# Patient Record
Sex: FEMALE | Race: ASIAN | NOT HISPANIC OR LATINO | URBAN - METROPOLITAN AREA
[De-identification: names, ages, dates, MRNs, and addresses within clinical notes are randomized per-mention and may not be internally consistent; named-entity substitution may affect disease eponyms.]

---

## 2022-02-09 ENCOUNTER — EMERGENCY (EMERGENCY)
Facility: HOSPITAL | Age: 47
LOS: 1 days | Discharge: HOME | End: 2022-02-09
Attending: EMERGENCY MEDICINE | Admitting: EMERGENCY MEDICINE
Payer: MEDICAID

## 2022-02-09 VITALS
RESPIRATION RATE: 16 BRPM | TEMPERATURE: 98 F | DIASTOLIC BLOOD PRESSURE: 85 MMHG | SYSTOLIC BLOOD PRESSURE: 138 MMHG | HEART RATE: 89 BPM | OXYGEN SATURATION: 99 %

## 2022-02-09 PROBLEM — Z00.00 ENCOUNTER FOR PREVENTIVE HEALTH EXAMINATION: Status: ACTIVE | Noted: 2022-02-09

## 2022-02-09 LAB
ALBUMIN SERPL ELPH-MCNC: 4.3 G/DL — SIGNIFICANT CHANGE UP (ref 3.3–5)
ALP SERPL-CCNC: 60 U/L — SIGNIFICANT CHANGE UP (ref 40–120)
ALT FLD-CCNC: 31 U/L — SIGNIFICANT CHANGE UP (ref 4–33)
ANION GAP SERPL CALC-SCNC: 13 MMOL/L — SIGNIFICANT CHANGE UP (ref 7–14)
AST SERPL-CCNC: 57 U/L — HIGH (ref 4–32)
BILIRUB SERPL-MCNC: 0.3 MG/DL — SIGNIFICANT CHANGE UP (ref 0.2–1.2)
BUN SERPL-MCNC: 12 MG/DL — SIGNIFICANT CHANGE UP (ref 7–23)
CALCIUM SERPL-MCNC: 9.3 MG/DL — SIGNIFICANT CHANGE UP (ref 8.4–10.5)
CHLORIDE SERPL-SCNC: 102 MMOL/L — SIGNIFICANT CHANGE UP (ref 98–107)
CO2 SERPL-SCNC: 20 MMOL/L — LOW (ref 22–31)
CREAT SERPL-MCNC: 0.34 MG/DL — LOW (ref 0.5–1.3)
GLUCOSE SERPL-MCNC: 100 MG/DL — HIGH (ref 70–99)
HCT VFR BLD CALC: 36.1 % — SIGNIFICANT CHANGE UP (ref 34.5–45)
HGB BLD-MCNC: 11.9 G/DL — SIGNIFICANT CHANGE UP (ref 11.5–15.5)
MCHC RBC-ENTMCNC: 27.9 PG — SIGNIFICANT CHANGE UP (ref 27–34)
MCHC RBC-ENTMCNC: 33 GM/DL — SIGNIFICANT CHANGE UP (ref 32–36)
MCV RBC AUTO: 84.7 FL — SIGNIFICANT CHANGE UP (ref 80–100)
NRBC # BLD: 0 /100 WBCS — SIGNIFICANT CHANGE UP
NRBC # FLD: 0 K/UL — SIGNIFICANT CHANGE UP
PLATELET # BLD AUTO: 209 K/UL — SIGNIFICANT CHANGE UP (ref 150–400)
POTASSIUM SERPL-MCNC: 4.9 MMOL/L — SIGNIFICANT CHANGE UP (ref 3.5–5.3)
POTASSIUM SERPL-SCNC: 4.9 MMOL/L — SIGNIFICANT CHANGE UP (ref 3.5–5.3)
PROT SERPL-MCNC: 7.9 G/DL — SIGNIFICANT CHANGE UP (ref 6–8.3)
RBC # BLD: 4.26 M/UL — SIGNIFICANT CHANGE UP (ref 3.8–5.2)
RBC # FLD: 13.2 % — SIGNIFICANT CHANGE UP (ref 10.3–14.5)
SODIUM SERPL-SCNC: 135 MMOL/L — SIGNIFICANT CHANGE UP (ref 135–145)
WBC # BLD: 5.04 K/UL — SIGNIFICANT CHANGE UP (ref 3.8–10.5)
WBC # FLD AUTO: 5.04 K/UL — SIGNIFICANT CHANGE UP (ref 3.8–10.5)

## 2022-02-09 PROCEDURE — 93971 EXTREMITY STUDY: CPT | Mod: 26,LT

## 2022-02-09 PROCEDURE — 99285 EMERGENCY DEPT VISIT HI MDM: CPT

## 2022-02-09 NOTE — ED PROVIDER NOTE - CLINICAL SUMMARY MEDICAL DECISION MAKING FREE TEXT BOX
Rahul: 10 years of varicose veins in the left leg and one year of left leg swelling. Worse at the end of the day and better in the morning after she’s been laying down at night. Not red or hot. It is painful, and she takes naproxen for that. Had a thorough evaluation in [facility] approximately two months ago, including lab work and x-rays and Doppler ultrasound that did not reveal a DVT but did reveal venous insufficiency. Suspect patient is having leg swelling from venous insufficiency. Will check labs and a Doppler ultrasound here and refer to vascular surgery.

## 2022-02-09 NOTE — ED PROVIDER NOTE - PROGRESS NOTE DETAILS
GREGG Ybarra: Pt requested a female provider examen a lesion on her labia. I was chaperoned by PA student Marivel Tirado. 1x1cm non tender mobile mass to right labia majora w/o fluctuance, no drainage, no redness or warmth to touch. Findings communicated to Dr. Kay

## 2022-02-09 NOTE — ED ADULT NURSE NOTE - PAIN: PRESENCE, MLM
complains of pain/discomfort Rifampin Pregnancy And Lactation Text: This medication is Pregnancy Category C and it isn't know if it is safe during pregnancy. It is also excreted in breast milk and should not be used if you are breast feeding.

## 2022-02-09 NOTE — ED PROVIDER NOTE - OBJECTIVE STATEMENT
Rahul: 10 years of varicose veins in the left leg and one year of left leg swelling. Worse at the end of the day and better in the morning after she’s been laying down at night. Not red or hot. It is painful, and she takes naproxen for that. Had a third evaluation in [facility] approximately two months ago including lab work and x-rays and Doppler ultrasound that do not reveal a DVT but did reveal Bessy and sufficiency. Suspect patient is having leg swelling from venous insufficiency. Will check labs and a Doppler ultrasound here and refer to vascular surgery.

## 2022-02-09 NOTE — ED ADULT TRIAGE NOTE - CHIEF COMPLAINT QUOTE
Pt c/o LLE pain and edema x2 months and was found to have a blockage so she came back here where she lives for treatment.

## 2022-02-09 NOTE — ED PROVIDER NOTE - PATIENT PORTAL LINK FT
You can access the FollowMyHealth Patient Portal offered by [facility] by registering at the following website: [email or web address]. By joining [facility]’s FollowMyHealth portal, you will also be able to view your health information using other applications (apps) compatible with our system.

## 2022-02-09 NOTE — ED ADULT NURSE NOTE - OBJECTIVE STATEMENT
pt A&ox4, came to ED for, LLE pain and swelling. pt states her leg has always been swollen but has started to get larger and more painful. pt states she had pmh of DVT in LLE. denies anticoagulation use. pt denies Chest pain and SOB. pt denies H/A, Dizziness, lightheadedness, and radiating chest pain. . breathing is spontaneous and unlabored. sating 99% on RA. bilateral pedal and radial pulses palpable and strong. Bed in lowest position, call bell within reach, all other safety and comfort measures provided. awaiting labs results and further orders.

## 2022-02-09 NOTE — ED PROVIDER NOTE - NSFOLLOWUPINSTRUCTIONS_ED_ALL_ED_FT
Follow with Vascular Surgery for evaluation and treatment of varicose veins and leg swelling. Tylenol 500 mg (1 or 2 every 6 hours) and/or naproxen 500 mg (1 every 12 hours) for pain.     Return if pain not controlled with oral medications.     Follow with Vascular Surgery for evaluation and treatment of varicose veins and leg swelling. Tylenol 500 mg (1 or 2 every 6 hours) and/or naproxen 500 mg (1 every 12 hours) for pain.     Return if pain not controlled with oral medications.     Follow with Vascular Surgery for evaluation and treatment of varicose veins and leg swelling.    Follow with Gyn regarding right labia majora nodule. Call [phone] and ask for an appointment at a convenient location.

## 2022-02-09 NOTE — ED ADULT NURSE NOTE - NSIMPLEMENTINTERV_GEN_ALL_ED
Implemented All Universal Safety Interventions:  [place] to call system. Call bell, personal items and telephone within reach. Instruct patient to call for assistance. Room bathroom lighting operational. Non-slip footwear when patient is off stretcher. Physically safe environment: no spills, clutter or unnecessary equipment. Stretcher in lowest position, wheels locked, appropriate side rails in place.

## 2022-02-09 NOTE — ED PROVIDER NOTE - PHYSICAL EXAMINATION
Well appearing, well nourished, awake, alert, oriented to person, place, time/situation and in no apparent distress.    Airway patent    Eyes without scleral injection. No jaundice.    Strong pulse.    Respirations unlabored.    Abdomen soft, non-tender, no guarding.    Spine appears normal, range of motion is not limited, no muscle or joint tenderness. LLE: no pitting edema. Varicose veins present.    Alert and oriented, no gross motor or sensory deficits.    Skin normal color for race, warm, dry and intact. No evidence of rash.    No SI/HI.

## 2022-02-10 ENCOUNTER — APPOINTMENT (OUTPATIENT)
Dept: VASCULAR SURGERY | Facility: CLINIC | Age: 47
End: 2022-02-10
Payer: MEDICAID

## 2022-02-10 VITALS
HEART RATE: 105 BPM | HEIGHT: 61 IN | WEIGHT: 167.55 LBS | BODY MASS INDEX: 31.63 KG/M2 | SYSTOLIC BLOOD PRESSURE: 155 MMHG | TEMPERATURE: 98.2 F | DIASTOLIC BLOOD PRESSURE: 104 MMHG

## 2022-02-10 VITALS — HEART RATE: 97 BPM | DIASTOLIC BLOOD PRESSURE: 98 MMHG | SYSTOLIC BLOOD PRESSURE: 151 MMHG

## 2022-02-10 PROCEDURE — 93985 DUP-SCAN HEMO COMPL BI STD: CPT

## 2022-02-10 PROCEDURE — 99204 OFFICE O/P NEW MOD 45 MIN: CPT

## 2022-02-16 ENCOUNTER — OUTPATIENT (OUTPATIENT)
Dept: OUTPATIENT SERVICES | Facility: HOSPITAL | Age: 47
LOS: 1 days | End: 2022-02-16
Payer: MEDICAID

## 2022-02-16 VITALS
OXYGEN SATURATION: 99 % | SYSTOLIC BLOOD PRESSURE: 148 MMHG | DIASTOLIC BLOOD PRESSURE: 97 MMHG | HEIGHT: 61 IN | WEIGHT: 171.08 LBS | HEART RATE: 80 BPM | TEMPERATURE: 99 F | RESPIRATION RATE: 20 BRPM

## 2022-02-16 DIAGNOSIS — Z11.52 ENCOUNTER FOR SCREENING FOR COVID-19: ICD-10-CM

## 2022-02-16 DIAGNOSIS — Z01.818 ENCOUNTER FOR OTHER PREPROCEDURAL EXAMINATION: ICD-10-CM

## 2022-02-16 DIAGNOSIS — I83.893 VARICOSE VEINS OF BILATERAL LOWER EXTREMITIES WITH OTHER COMPLICATIONS: ICD-10-CM

## 2022-02-16 DIAGNOSIS — Z86.79 PERSONAL HISTORY OF OTHER DISEASES OF THE CIRCULATORY SYSTEM: ICD-10-CM

## 2022-02-16 DIAGNOSIS — Z29.9 ENCOUNTER FOR PROPHYLACTIC MEASURES, UNSPECIFIED: ICD-10-CM

## 2022-02-16 DIAGNOSIS — G47.33 OBSTRUCTIVE SLEEP APNEA (ADULT) (PEDIATRIC): ICD-10-CM

## 2022-02-16 DIAGNOSIS — R60.0 LOCALIZED EDEMA: ICD-10-CM

## 2022-02-16 LAB
ANION GAP SERPL CALC-SCNC: 13 MMOL/L — SIGNIFICANT CHANGE UP (ref 5–17)
BUN SERPL-MCNC: 9 MG/DL — SIGNIFICANT CHANGE UP (ref 7–23)
CALCIUM SERPL-MCNC: 9.8 MG/DL — SIGNIFICANT CHANGE UP (ref 8.4–10.5)
CHLORIDE SERPL-SCNC: 101 MMOL/L — SIGNIFICANT CHANGE UP (ref 96–108)
CO2 SERPL-SCNC: 25 MMOL/L — SIGNIFICANT CHANGE UP (ref 22–31)
CREAT SERPL-MCNC: 0.44 MG/DL — LOW (ref 0.5–1.3)
GLUCOSE SERPL-MCNC: 92 MG/DL — SIGNIFICANT CHANGE UP (ref 70–99)
HCT VFR BLD CALC: 39.6 % — SIGNIFICANT CHANGE UP (ref 34.5–45)
HGB BLD-MCNC: 12.7 G/DL — SIGNIFICANT CHANGE UP (ref 11.5–15.5)
MCHC RBC-ENTMCNC: 28 PG — SIGNIFICANT CHANGE UP (ref 27–34)
MCHC RBC-ENTMCNC: 32.1 GM/DL — SIGNIFICANT CHANGE UP (ref 32–36)
MCV RBC AUTO: 87.2 FL — SIGNIFICANT CHANGE UP (ref 80–100)
NRBC # BLD: 0 /100 WBCS — SIGNIFICANT CHANGE UP (ref 0–0)
PLATELET # BLD AUTO: 254 K/UL — SIGNIFICANT CHANGE UP (ref 150–400)
POTASSIUM SERPL-MCNC: 3.6 MMOL/L — SIGNIFICANT CHANGE UP (ref 3.5–5.3)
POTASSIUM SERPL-SCNC: 3.6 MMOL/L — SIGNIFICANT CHANGE UP (ref 3.5–5.3)
RBC # BLD: 4.54 M/UL — SIGNIFICANT CHANGE UP (ref 3.8–5.2)
RBC # FLD: 13.2 % — SIGNIFICANT CHANGE UP (ref 10.3–14.5)
SARS-COV-2 RNA SPEC QL NAA+PROBE: SIGNIFICANT CHANGE UP
SODIUM SERPL-SCNC: 139 MMOL/L — SIGNIFICANT CHANGE UP (ref 135–145)
WBC # BLD: 7.19 K/UL — SIGNIFICANT CHANGE UP (ref 3.8–10.5)
WBC # FLD AUTO: 7.19 K/UL — SIGNIFICANT CHANGE UP (ref 3.8–10.5)

## 2022-02-16 PROCEDURE — C9803: CPT

## 2022-02-16 PROCEDURE — 80048 BASIC METABOLIC PNL TOTAL CA: CPT

## 2022-02-16 PROCEDURE — U0003: CPT

## 2022-02-16 PROCEDURE — G0463: CPT

## 2022-02-16 PROCEDURE — 85027 COMPLETE CBC AUTOMATED: CPT

## 2022-02-16 PROCEDURE — 36415 COLL VENOUS BLD VENIPUNCTURE: CPT

## 2022-02-16 PROCEDURE — U0005: CPT

## 2022-02-16 RX ORDER — CEFAZOLIN SODIUM 1 G
2000 VIAL (EA) INJECTION ONCE
Refills: 0 | Status: DISCONTINUED | OUTPATIENT
Start: 2022-02-18 | End: 2022-03-05

## 2022-02-16 RX ORDER — LOSARTAN POTASSIUM 100 MG/1
1 TABLET, FILM COATED ORAL
Qty: 0 | Refills: 0 | DISCHARGE

## 2022-02-16 RX ORDER — AMLODIPINE BESYLATE 2.5 MG/1
1 TABLET ORAL
Qty: 0 | Refills: 0 | DISCHARGE

## 2022-02-16 RX ORDER — SODIUM CHLORIDE 9 MG/ML
3 INJECTION INTRAMUSCULAR; INTRAVENOUS; SUBCUTANEOUS EVERY 8 HOURS
Refills: 0 | Status: DISCONTINUED | OUTPATIENT
Start: 2022-02-18 | End: 2022-02-18

## 2022-02-16 NOTE — H&P PST ADULT - PROBLEM SELECTOR PLAN 1
Bilateral Stab Phlebectomies with sonosite ultrasound 2/18/22.  CBC, BMP  COVID 2/16 @ [facility]

## 2022-02-16 NOTE — H&P PST ADULT - ASSESSMENT
CAPRINI SCORE [CLOT]    AGE RELATED RISK FACTORS                                                       MOBILITY RELATED FACTORS  [ x] Age 41-60 years                                            (1 Point)                  [ ] Bed rest                                                        (1 Point)  [ ] Age: 61-74 years                                           (2 Points)                 [ ] Plaster cast                                                   (2 Points)  [ ] Age= 75 years                                              (3 Points)                 [ ] Bed bound for more than 72 hours                 (2 Points)    DISEASE RELATED RISK FACTORS                                               GENDER SPECIFIC FACTORS  [ ] Edema in the lower extremities                       (1 Point)                  [ ] Pregnancy                                                     (1 Point)  [x ] Varicose veins                                               (1 Point)                  [ ] Post-partum < 6 weeks                                   (1 Point)             [ ] BMI > 25 Kg/m2                                            (1 Point)                  [ ] Hormonal therapy  or oral contraception          (1 Point)                 [ ] Sepsis (in the previous month)                        (1 Point)                  [ ] History of pregnancy complications                 (1 point)  [ ] Pneumonia or serious lung disease                                               [ ] Unexplained or recurrent                     (1 Point)           (in the previous month)                               (1 Point)  [ ] Abnormal pulmonary function test                     (1 Point)                 SURGERY RELATED RISK FACTORS  [ ] Acute myocardial infarction                              (1 Point)                 [ ]  Section                                             (1 Point)  [ ] Congestive heart failure (in the previous month)  (1 Point)               [ ] Minor surgery                                                  (1 Point)   [ ] Inflammatory bowel disease                             (1 Point)                 [ ] Arthroscopic surgery                                        (2 Points)  [ ] Central venous access                                      (2 Points)                [ x] General surgery lasting more than 45 minutes   (2 Points)       [ ] Stroke (in the previous month)                          (5 Points)               [ ] Elective arthroplasty                                         (5 Points)                                                                                                                                               HEMATOLOGY RELATED FACTORS                                                 TRAUMA RELATED RISK FACTORS  [ ] Prior episodes of VTE                                     (3 Points)                 [ ] Fracture of the hip, pelvis, or leg                       (5 Points)  [ ] Positive family history for VTE                         (3 Points)                 [ ] Acute spinal cord injury (in the previous month)  (5 Points)  [ ] Prothrombin 79406 A                                     (3 Points)                 [ ] Paralysis  (less than 1 month)                             (5 Points)  [ ] Factor V Leiden                                             (3 Points)                  [ ] Multiple Trauma within 1 month                        (5 Points)  [ ] Lupus anticoagulants                                     (3 Points)                                                           [ ] Anticardiolipin antibodies                               (3 Points)                                                       [ ] High homocysteine in the blood                      (3 Points)                                             [ ] Other congenital or acquired thrombophilia      (3 Points)                                                [ ] Heparin induced thrombocytopenia                  (3 Points)                                          Total Score [   4       ]

## 2022-02-16 NOTE — H&P PST ADULT - HISTORY OF PRESENT ILLNESS
Ms. Eller is a 46-year-old woman who has a longstanding history of bilateral lower extremity varicose veins.  She has worn compression stockings in the past without improvement.  She complains of pain, heaviness, fatigue, cramping and hyperpigmentation in both lower extremities.  She experiences swelling at the level of the ankles bilaterally.  This interferes with her activities of daily living. She has no history of DVT, PE, or coagulopathy. A venous reflux study which demonstrates a short segment of axial reflux in the left short saphenous vein and great saphenous vein but they are very small in length. Seen & evaluated by Dr Nikko Garcia and now presents for bilateral Stab Phlebectomies with sonosite ultrasound 2/18/22.     covid test 2/16 at [facility]  Ms. Eller is a 46-year-old woman who has a longstanding history of bilateral lower extremity varicose veins.  She has worn compression stockings in the past without improvement.  She complains of pain, heaviness, fatigue, cramping and hyperpigmentation in both lower extremities.  She experiences swelling at the level of the ankles bilaterally.  This interferes with her activities of daily living. She has no history of DVT, PE, or coagulopathy. A venous reflux study which demonstrates a short segment of axial reflux in the left short saphenous vein and great saphenous vein but they are very small in length. Seen & evaluated by Dr Nikko Garcia and now presents for Bilateral Stab Phlebectomies with sonosite ultrasound 2/18/22.     covid test 2/16 at [facility]

## 2022-02-17 ENCOUNTER — TRANSCRIPTION ENCOUNTER (OUTPATIENT)
Age: 47
End: 2022-02-17

## 2022-02-18 ENCOUNTER — OUTPATIENT (OUTPATIENT)
Dept: OUTPATIENT SERVICES | Facility: HOSPITAL | Age: 47
LOS: 1 days | Discharge: HOME | End: 2022-02-18
Payer: MEDICAID

## 2022-02-18 ENCOUNTER — APPOINTMENT (OUTPATIENT)
Dept: VASCULAR SURGERY | Facility: HOSPITAL | Age: 47
End: 2022-02-18

## 2022-02-18 VITALS
RESPIRATION RATE: 15 BRPM | DIASTOLIC BLOOD PRESSURE: 77 MMHG | OXYGEN SATURATION: 98 % | HEART RATE: 76 BPM | SYSTOLIC BLOOD PRESSURE: 153 MMHG

## 2022-02-18 VITALS — HEIGHT: 60.98 IN | WEIGHT: 171.08 LBS

## 2022-02-18 DIAGNOSIS — I83.893 VARICOSE VEINS OF BILATERAL LOWER EXTREMITIES WITH OTHER COMPLICATIONS: ICD-10-CM

## 2022-02-18 DIAGNOSIS — R60.0 LOCALIZED EDEMA: ICD-10-CM

## 2022-02-18 LAB — HCG UR QL: NEGATIVE — SIGNIFICANT CHANGE UP

## 2022-02-18 PROCEDURE — 37765 STAB PHLEB VEINS XTR 10-20: CPT | Mod: RT,59

## 2022-02-18 PROCEDURE — 81025 URINE PREGNANCY TEST: CPT

## 2022-02-18 PROCEDURE — 37766 PHLEB VEINS - EXTREM 20+: CPT | Mod: LT

## 2022-02-18 PROCEDURE — 37765 STAB PHLEB VEINS XTR 10-20: CPT | Mod: RT

## 2022-02-18 RX ORDER — FENTANYL CITRATE 50 UG/ML
50 INJECTION INTRAVENOUS
Refills: 0 | Status: DISCONTINUED | OUTPATIENT
Start: 2022-02-18 | End: 2022-02-18

## 2022-02-18 RX ORDER — HYDRALAZINE HCL 50 MG
10 TABLET ORAL ONCE
Refills: 0 | Status: COMPLETED | OUTPATIENT
Start: 2022-02-18 | End: 2022-02-18

## 2022-02-18 RX ORDER — FENTANYL CITRATE 50 UG/ML
25 INJECTION INTRAVENOUS
Refills: 0 | Status: DISCONTINUED | OUTPATIENT
Start: 2022-02-18 | End: 2022-02-18

## 2022-02-18 RX ORDER — ONDANSETRON 8 MG/1
4 TABLET, FILM COATED ORAL ONCE
Refills: 0 | Status: DISCONTINUED | OUTPATIENT
Start: 2022-02-18 | End: 2022-02-18

## 2022-02-18 RX ADMIN — FENTANYL CITRATE 25 MICROGRAM(S): 50 INJECTION INTRAVENOUS at 17:10

## 2022-02-18 RX ADMIN — Medication 10 MILLIGRAM(S): at 16:08

## 2022-02-18 RX ADMIN — FENTANYL CITRATE 25 MICROGRAM(S): 50 INJECTION INTRAVENOUS at 16:48

## 2022-02-18 NOTE — ASU DISCHARGE PLAN (ADULT/PEDIATRIC) - NS MD DC FALL RISK RISK
For information on Fall & Injury Prevention, visit: [email or web address] OR  [email or web address] OR  https://www.cdc.gov/steadi/patient.html

## 2022-02-18 NOTE — BRIEF OPERATIVE NOTE - NSICDXBRIEFPROCEDURE_GEN_ALL_CORE_FT
PROCEDURES:  Stab phlebectomy, varicose vein, greater than 20 stab incisions 18-Feb-2022 15:59:46  Jeb Corey

## 2022-02-18 NOTE — ASU PATIENT PROFILE, ADULT - FALL HARM RISK - UNIVERSAL INTERVENTIONS
Bed in lowest position, wheels locked, appropriate side rails in place/Call bell, personal items and telephone in reach/Instruct patient to call for assistance before getting out of bed or chair/Non-slip footwear when patient is out of bed/[place] to call system/Physically safe environment - no spills, clutter or unnecessary equipment/Purposeful Proactive Rounding/Room/bathroom lighting operational, light cord in reach

## 2022-02-18 NOTE — ASU DISCHARGE PLAN (ADULT/PEDIATRIC) - CARE PROVIDER_API CALL
Ishan Barba)  Surgery  Vascular  [address] [place] [address]  Phone: [phone]  Fax: [phone]  Established Patient  Follow Up Time: 2 weeks

## 2022-02-18 NOTE — PRE-ANESTHESIA EVALUATION ADULT - NSANTHPMHFT_GEN_ALL_CORE
46-year-old woman who has a longstanding history of bilateral lower extremity varicose veins.  She has worn compression stockings in the past without improvement.  She complains of pain, heaviness, fatigue, cramping and hyperpigmentation in both lower extremities.  She experiences swelling at the level of the ankles bilaterally.  This interferes with her activities of daily living. She has no history of DVT, PE, or coagulopathy. A venous reflux study which demonstrates a short segment of axial reflux in the left short saphenous vein and great saphenous vein but they are very small in length.

## 2022-02-18 NOTE — ASU DISCHARGE PLAN (ADULT/PEDIATRIC) - ASU DC SPECIAL INSTRUCTIONSFT
Do not shower until tomorrow night. Tomorrow night (2/19), remove both ACE wrap dressings and gauze wrapping. Keep the steri strips on, they will fall off on their own.    Resume normal activity and resume all home medications.     Follow up with Dr. Barba in 2 weeks. Do not shower until tomorrow night. Tomorrow night (2/19), remove both ACE wrap dressings and gauze wrapping. Keep the steri strips on, they will fall off on their own.    Resume normal activity and resume all home medications. Please take over the counter Tylenol 650 mg, if needed, for pain.    Follow up with Dr. Barba in 2 weeks.

## 2022-02-28 PROBLEM — I10 ESSENTIAL (PRIMARY) HYPERTENSION: Chronic | Status: ACTIVE | Noted: 2022-02-16

## 2022-02-28 PROBLEM — E78.5 HYPERLIPIDEMIA, UNSPECIFIED: Chronic | Status: ACTIVE | Noted: 2022-02-16

## 2022-02-28 PROBLEM — G43.909 MIGRAINE, UNSPECIFIED, NOT INTRACTABLE, WITHOUT STATUS MIGRAINOSUS: Chronic | Status: ACTIVE | Noted: 2022-02-16

## 2022-03-03 ENCOUNTER — APPOINTMENT (OUTPATIENT)
Dept: VASCULAR SURGERY | Facility: CLINIC | Age: 47
End: 2022-03-03
Payer: MEDICAID

## 2022-03-03 VITALS
BODY MASS INDEX: 33.79 KG/M2 | HEART RATE: 87 BPM | DIASTOLIC BLOOD PRESSURE: 95 MMHG | SYSTOLIC BLOOD PRESSURE: 148 MMHG | TEMPERATURE: 96 F | HEIGHT: 61 IN | WEIGHT: 179 LBS

## 2022-03-03 DIAGNOSIS — I83.893 VARICOSE VEINS OF BILATERAL LOWER EXTREMITIES WITH OTHER COMPLICATIONS: ICD-10-CM

## 2022-03-03 PROCEDURE — 99024 POSTOP FOLLOW-UP VISIT: CPT

## 2022-03-03 RX ORDER — AMMONIUM LACTATE 12 %
12 CREAM (GRAM) TOPICAL TWICE DAILY
Qty: 280 | Refills: 2 | Status: ACTIVE | COMMUNITY
Start: 2022-03-03 | End: 1900-01-01

## 2022-03-03 RX ORDER — AMLODIPINE BESYLATE 5 MG/1
TABLET ORAL
Refills: 0 | Status: ACTIVE | COMMUNITY

## 2022-03-03 RX ORDER — ATORVASTATIN CALCIUM 80 MG/1
TABLET, FILM COATED ORAL
Refills: 0 | Status: ACTIVE | COMMUNITY

## 2022-03-03 RX ORDER — LOSARTAN POTASSIUM 100 MG/1
TABLET, FILM COATED ORAL
Refills: 0 | Status: ACTIVE | COMMUNITY

## 2022-03-03 NOTE — PHYSICAL EXAM
[de-identified] : a [FreeTextEntry1] : well healed stab phlebectomy sites in both legs\par no swelling in either leg

## 2022-03-03 NOTE — DISCUSSION/SUMMARY
[FreeTextEntry1] : Problem #1 symptomatic varicose veins of both legs\par - s/p stab phlebectomies with resolution of symptoms in legs\par - rec compression and elevation of both legs for underlying CVI\par \par Problem #2 neuropathy in feet\par - continue gabapentin\par - rec eval by PCP

## 2022-03-03 NOTE — REASON FOR VISIT
[Spouse] : spouse [de-identified] : Bilateral lower extremity stab phlebectomies [de-identified] : 02/18/22 [de-identified] : 2 [de-identified] : Patients states she is still having tingling in her feet only. She states both her legs feels much better than they did prior to surgery. She denies numbness or pain in her feet. She states gabapentin helps to relieve her pain.

## 2022-03-15 ENCOUNTER — APPOINTMENT (OUTPATIENT)
Dept: VASCULAR SURGERY | Facility: CLINIC | Age: 47
End: 2022-03-15
Payer: MEDICAID

## 2022-03-15 PROCEDURE — 99441: CPT

## 2022-03-16 ENCOUNTER — EMERGENCY (EMERGENCY)
Facility: HOSPITAL | Age: 47
LOS: 1 days | Discharge: HOME | End: 2022-03-16
Attending: EMERGENCY MEDICINE
Payer: MEDICAID

## 2022-03-16 VITALS
HEIGHT: 61 IN | DIASTOLIC BLOOD PRESSURE: 90 MMHG | WEIGHT: 169.98 LBS | OXYGEN SATURATION: 96 % | TEMPERATURE: 98 F | RESPIRATION RATE: 18 BRPM | SYSTOLIC BLOOD PRESSURE: 137 MMHG | HEART RATE: 115 BPM

## 2022-03-16 PROCEDURE — 99285 EMERGENCY DEPT VISIT HI MDM: CPT

## 2022-03-16 NOTE — ED ADULT TRIAGE NOTE - BSA (M2)
Patient verbally informed that body search would be performed to  remove any items that may be potentially used for self harm or suicidal behavior, ensure that no potentially dangerous mind or mood altering drugs were being introduced into treatment environment, remove any items that may pose a risk for personal safety due to thought or mood disorder and advised about what would occur during the search process.  Patient denies being in possession of potentially dangerous items.  The patient was provided with an opportunity to ask questions or voice any concerns related to the body surface search prior to it being performed.  The patient agreed to participate in the search process.  Body surface search was conducted by two staff members: (Sylwia TRUJILLO and Conrad AWAD).  Patient response to search process was Cooperative with no adverse physical or psychological response.  Contraband was not found during the search process.    1.76

## 2022-03-17 VITALS
HEART RATE: 93 BPM | SYSTOLIC BLOOD PRESSURE: 122 MMHG | RESPIRATION RATE: 18 BRPM | OXYGEN SATURATION: 98 % | TEMPERATURE: 98 F | DIASTOLIC BLOOD PRESSURE: 77 MMHG

## 2022-03-17 LAB
ALBUMIN SERPL ELPH-MCNC: 3.8 G/DL — SIGNIFICANT CHANGE UP (ref 3.5–5)
ALP SERPL-CCNC: 62 U/L — SIGNIFICANT CHANGE UP (ref 40–120)
ALT FLD-CCNC: 36 U/L DA — SIGNIFICANT CHANGE UP (ref 10–60)
ANION GAP SERPL CALC-SCNC: 6 MMOL/L — SIGNIFICANT CHANGE UP (ref 5–17)
AST SERPL-CCNC: 20 U/L — SIGNIFICANT CHANGE UP (ref 10–40)
BASOPHILS # BLD AUTO: 0.01 K/UL — SIGNIFICANT CHANGE UP (ref 0–0.2)
BASOPHILS NFR BLD AUTO: 0.1 % — SIGNIFICANT CHANGE UP (ref 0–2)
BILIRUB SERPL-MCNC: 0.3 MG/DL — SIGNIFICANT CHANGE UP (ref 0.2–1.2)
BUN SERPL-MCNC: 7 MG/DL — SIGNIFICANT CHANGE UP (ref 7–18)
CALCIUM SERPL-MCNC: 8.9 MG/DL — SIGNIFICANT CHANGE UP (ref 8.4–10.5)
CHLORIDE SERPL-SCNC: 107 MMOL/L — SIGNIFICANT CHANGE UP (ref 96–108)
CO2 SERPL-SCNC: 27 MMOL/L — SIGNIFICANT CHANGE UP (ref 22–31)
CREAT SERPL-MCNC: 0.53 MG/DL — SIGNIFICANT CHANGE UP (ref 0.5–1.3)
D DIMER BLD IA.RAPID-MCNC: <150 NG/ML DDU — SIGNIFICANT CHANGE UP
EGFR: 114 ML/MIN/1.73M2 — SIGNIFICANT CHANGE UP
EOSINOPHIL # BLD AUTO: 0.07 K/UL — SIGNIFICANT CHANGE UP (ref 0–0.5)
EOSINOPHIL NFR BLD AUTO: 0.8 % — SIGNIFICANT CHANGE UP (ref 0–6)
GLUCOSE SERPL-MCNC: 137 MG/DL — HIGH (ref 70–99)
HCG SERPL-ACNC: <1 MIU/ML — SIGNIFICANT CHANGE UP
HCT VFR BLD CALC: 36.7 % — SIGNIFICANT CHANGE UP (ref 34.5–45)
HGB BLD-MCNC: 11.9 G/DL — SIGNIFICANT CHANGE UP (ref 11.5–15.5)
IMM GRANULOCYTES NFR BLD AUTO: 0.3 % — SIGNIFICANT CHANGE UP (ref 0–1.5)
LYMPHOCYTES # BLD AUTO: 2.2 K/UL — SIGNIFICANT CHANGE UP (ref 1–3.3)
LYMPHOCYTES # BLD AUTO: 25.1 % — SIGNIFICANT CHANGE UP (ref 13–44)
MCHC RBC-ENTMCNC: 27.7 PG — SIGNIFICANT CHANGE UP (ref 27–34)
MCHC RBC-ENTMCNC: 32.4 GM/DL — SIGNIFICANT CHANGE UP (ref 32–36)
MCV RBC AUTO: 85.3 FL — SIGNIFICANT CHANGE UP (ref 80–100)
MONOCYTES # BLD AUTO: 0.55 K/UL — SIGNIFICANT CHANGE UP (ref 0–0.9)
MONOCYTES NFR BLD AUTO: 6.3 % — SIGNIFICANT CHANGE UP (ref 2–14)
NEUTROPHILS # BLD AUTO: 5.89 K/UL — SIGNIFICANT CHANGE UP (ref 1.8–7.4)
NEUTROPHILS NFR BLD AUTO: 67.4 % — SIGNIFICANT CHANGE UP (ref 43–77)
NRBC # BLD: 0 /100 WBCS — SIGNIFICANT CHANGE UP (ref 0–0)
PLATELET # BLD AUTO: 274 K/UL — SIGNIFICANT CHANGE UP (ref 150–400)
POTASSIUM SERPL-MCNC: 3.4 MMOL/L — LOW (ref 3.5–5.3)
POTASSIUM SERPL-SCNC: 3.4 MMOL/L — LOW (ref 3.5–5.3)
PROT SERPL-MCNC: 7.7 G/DL — SIGNIFICANT CHANGE UP (ref 6–8.3)
RBC # BLD: 4.3 M/UL — SIGNIFICANT CHANGE UP (ref 3.8–5.2)
RBC # FLD: 13.2 % — SIGNIFICANT CHANGE UP (ref 10.3–14.5)
SODIUM SERPL-SCNC: 140 MMOL/L — SIGNIFICANT CHANGE UP (ref 135–145)
TROPONIN I, HIGH SENSITIVITY RESULT: <3 NG/L — SIGNIFICANT CHANGE UP
WBC # BLD: 8.75 K/UL — SIGNIFICANT CHANGE UP (ref 3.8–10.5)
WBC # FLD AUTO: 8.75 K/UL — SIGNIFICANT CHANGE UP (ref 3.8–10.5)

## 2022-03-17 PROCEDURE — 84484 ASSAY OF TROPONIN QUANT: CPT

## 2022-03-17 PROCEDURE — 85025 COMPLETE CBC W/AUTO DIFF WBC: CPT

## 2022-03-17 PROCEDURE — 80053 COMPREHEN METABOLIC PANEL: CPT

## 2022-03-17 PROCEDURE — 71045 X-RAY EXAM CHEST 1 VIEW: CPT | Mod: 26

## 2022-03-17 PROCEDURE — 85379 FIBRIN DEGRADATION QUANT: CPT

## 2022-03-17 PROCEDURE — 93005 ELECTROCARDIOGRAM TRACING: CPT

## 2022-03-17 PROCEDURE — 99285 EMERGENCY DEPT VISIT HI MDM: CPT | Mod: 25

## 2022-03-17 PROCEDURE — 84702 CHORIONIC GONADOTROPIN TEST: CPT

## 2022-03-17 PROCEDURE — 36415 COLL VENOUS BLD VENIPUNCTURE: CPT

## 2022-03-17 PROCEDURE — 71045 X-RAY EXAM CHEST 1 VIEW: CPT

## 2022-03-17 PROCEDURE — 96374 THER/PROPH/DIAG INJ IV PUSH: CPT

## 2022-03-17 RX ORDER — IBUPROFEN 200 MG
1 TABLET ORAL
Qty: 28 | Refills: 0
Start: 2022-03-17 | End: 2022-03-23

## 2022-03-17 RX ORDER — OXYCODONE AND ACETAMINOPHEN 5; 325 MG/1; MG/1
1 TABLET ORAL
Qty: 12 | Refills: 0
Start: 2022-03-17 | End: 2022-03-19

## 2022-03-17 RX ORDER — KETOROLAC TROMETHAMINE 30 MG/ML
30 SYRINGE (ML) INJECTION ONCE
Refills: 0 | Status: DISCONTINUED | OUTPATIENT
Start: 2022-03-17 | End: 2022-03-17

## 2022-03-17 RX ADMIN — Medication 30 MILLIGRAM(S): at 01:21

## 2022-03-17 NOTE — ED PROVIDER NOTE - NSFOLLOWUPCLINICS_GEN_ALL_ED_FT
[facility]  Cardiology  [address] [facility] [address], [address]  [place] [address]  Phone: [phone]  Fax:     
bed rails

## 2022-03-17 NOTE — ED ADULT NURSE NOTE - NSIMPLEMENTINTERV_GEN_ALL_ED
Implemented All Universal Safety Interventions:  [place] to call system. Call bell, personal items and telephone within reach. Instruct patient to call for assistance. Room bathroom lighting operational. Non-slip footwear when patient is off stretcher. Physically safe environment: no spills, clutter or unnecessary equipment. Stretcher in lowest position, wheels locked, appropriate side rails in place.

## 2022-03-17 NOTE — ED PROVIDER NOTE - PROGRESS NOTE DETAILS
labs are unremarkable. cxr clear. ecg sinus tachy, no acute ischemic changes. spoke to be about admission vs. dc and f/u with cardio outpatient- pt prefers to f/u outpatient. will also give rx for pain meds and abx for vaginal abscess. f/u cardiology and obgyn. return precautions discussed.

## 2022-03-17 NOTE — ED PROVIDER NOTE - CLINICAL SUMMARY MEDICAL DECISION MAKING FREE TEXT BOX
46 year old female with cp and right vaginal abscess. PE as above.  labs, ecg, cxr, pain control, reassess

## 2022-03-17 NOTE — ED PROVIDER NOTE - PATIENT PORTAL LINK FT
You can access the FollowMyHealth Patient Portal offered by [facility] by registering at the following website: [email or web address]. By joining [facility]’s FollowMyHealth portal, you will also be able to view your health information using other applications (apps) compatible with our system.

## 2022-03-17 NOTE — ED ADULT NURSE NOTE - CHIEF COMPLAINT QUOTE
biba with c/o rt. sided chest pain and ruptured cysts in the vagina h/o of heart attack in the past as per ems

## 2022-03-17 NOTE — ED PROVIDER NOTE - OBJECTIVE STATEMENT
46 year old female PMh HTN, HLD, CAD? (had angio in 2017 but no stents placed) coming in with 2 weeks of chest pain, sometimes left, sometimes, right, sometimes substernal that hasn't gone away. also states abscess to right vaginal area which is recurrent. denies cough, fevers, chills, sweats, palpitations, abd pains, N/v/D/C, back pains, ha, dizziness.

## 2022-03-17 NOTE — ED PROVIDER NOTE - NSFOLLOWUPINSTRUCTIONS_ED_ALL_ED_FT
Log Out.      [employer] CareNotes®     :  [facility]  	                       CHEST PAIN - AfterCare(R) Instructions(ER/ED)           Chest Pain    WHAT YOU NEED TO KNOW:    Chest pain can be caused by a range of conditions, from not serious to life-threatening. Chest pain can be a symptom of a digestive problem, such as acid reflux or a stomach ulcer. An anxiety attack or a strong emotion, such as anger, can also cause chest pain. Infection, inflammation, or a fracture in the bones or cartilage in your chest can cause pain or discomfort. Sometimes chest pain or pressure is caused by poor blood flow to your heart (angina). Chest pain may also be caused by life-threatening conditions such as a heart attack or blood clot in your lungs.    DISCHARGE INSTRUCTIONS:    Call your local emergency number (911 in the [place]) or have someone call if:   •You have any of the following signs of a heart attack: ?Squeezing, pressure, or pain in your chest      ?You may also have any of the following: ?Discomfort or pain in your back, neck, jaw, stomach, or arm      ?Shortness of breath      ?Nausea or vomiting      ?Lightheadedness or a sudden cold sweat            Return to the emergency department if:   •You have chest discomfort that gets worse, even with medicine.      •You cough or vomit blood.      •Your bowel movements are black or bloody.      •You cannot stop vomiting, or it hurts to swallow.      Call your doctor if:   •You have questions or concerns about your condition or care.          Medicines:   •Medicines may be given to treat the cause of your chest pain. Examples include pain medicine, anxiety medicine, or medicines to increase blood flow to your heart.      •Do not take certain medicines without asking your healthcare provider first. These include NSAIDs, herbal or vitamin supplements, and hormones, such as estrogen or progestin.      •Take your medicine as directed. Contact your healthcare provider if you think your medicine is not helping or if you have side effects. Tell him or her if you are allergic to any medicine. Keep a list of the medicines, vitamins, and herbs you take. Include the amounts, and when and why you take them. Bring the list or the pill bottles to follow-up visits. Carry your medicine list with you in case of an emergency.      Healthy living tips: If the cause of your chest pain is known, your healthcare provider will give you specific guidelines to follow. The following are general healthy guidelines:  •Do not smoke. Nicotine and other chemicals in cigarettes and cigars can cause lung and heart damage. Ask your healthcare provider for information if you currently smoke and need help to quit. E-cigarettes or smokeless tobacco still contain nicotine. Talk to your healthcare provider before you use these products.      •Choose a variety of healthy foods as often as possible. Include fresh, frozen, or canned fruits and vegetables. Also include low-fat dairy products, fish, chicken (without skin), and lean meats. Your healthcare provider or a dietitian can help you create meal plans. You may need to avoid certain foods or drinks if your pain is caused by a digestion problem.  Healthy Foods           •Lower your sodium (salt) intake. Limit foods that are high in sodium, such as canned foods, salty snacks, and cold cuts. If you add salt when you cook food, do not add more at the table. Choose low-sodium canned foods as much as possible.             •Drink plenty of water every day. Water helps your body to control your temperature and blood pressure. Ask your healthcare provider how much water you should drink every day.      •Ask about activity. Your healthcare provider will tell you which activities to limit or avoid. Ask when you can drive, return to work, and have sex. Ask about the best exercise plan for you.      •Maintain a healthy weight. Ask your healthcare provider what a healthy weight is for you. Ask him or her to help you create a safe weight loss plan if you are overweight.      •Ask about vaccines you may need. Your healthcare provider can tell you which vaccines you need, and when to get them. The following vaccines help prevent diseases that can become serious for a person with a heart condition:?The influenza (flu) vaccine is given each year. Get a flu vaccine as soon as recommended, usually in September or October.      ?The pneumonia vaccine is usually given every 5 years. Your healthcare provider may recommend the pneumonia vaccine if you are 65 or older.      ?COVID-19 vaccines are given to adults as a shot in 1 or 2 doses.   Vaccination is recommended for all adults. A booster (additional) dose is also recommended to help your immune system continue to protect against severe COVID-19. The booster can be a different brand of the COVID-19 vaccine than you originally received. The timing for the booster depends on the type of vaccine you received:?1-dose vaccine: The booster is given at least 2 months after you received the vaccine.      ?2-dose vaccine: The booster is given at least 5 to 6 months after the second dose.         Prevent Heart Disease          Follow up with your doctor within 72 hours, or as directed: You may need to return for more tests to find the cause of your chest pain. You may be referred to a specialist, such as a cardiologist or gastroenterologist. Write down your questions so you remember to ask them during your visits.       © Copyright [employer] 2022           back to top                          © Copyright [employer] 2022

## 2022-05-01 NOTE — ED ADULT TRIAGE NOTE - AS HEIGHT TYPE
[Dear  ___] : Dear  [unfilled], [Consult Letter:] : I had the pleasure of evaluating your patient, [unfilled]. [Please see my note below.] : Please see my note below. [Sincerely,] : Sincerely, [FreeTextEntry3] : Héctor Guaman MD, FACS\par  stated

## 2022-08-10 ENCOUNTER — OUTPATIENT (OUTPATIENT)
Dept: OUTPATIENT SERVICES | Facility: HOSPITAL | Age: 47
LOS: 1 days | End: 2022-08-10
Payer: MEDICAID

## 2022-08-10 VITALS
OXYGEN SATURATION: 96 % | WEIGHT: 167.99 LBS | RESPIRATION RATE: 16 BRPM | HEIGHT: 60 IN | HEART RATE: 94 BPM | TEMPERATURE: 98 F | DIASTOLIC BLOOD PRESSURE: 90 MMHG | SYSTOLIC BLOOD PRESSURE: 151 MMHG

## 2022-08-10 DIAGNOSIS — K21.9 GASTRO-ESOPHAGEAL REFLUX DISEASE WITHOUT ESOPHAGITIS: ICD-10-CM

## 2022-08-10 DIAGNOSIS — E78.5 HYPERLIPIDEMIA, UNSPECIFIED: ICD-10-CM

## 2022-08-10 DIAGNOSIS — Z98.890 OTHER SPECIFIED POSTPROCEDURAL STATES: Chronic | ICD-10-CM

## 2022-08-10 DIAGNOSIS — I10 ESSENTIAL (PRIMARY) HYPERTENSION: ICD-10-CM

## 2022-08-10 DIAGNOSIS — M72.2 PLANTAR FASCIAL FIBROMATOSIS: ICD-10-CM

## 2022-08-10 DIAGNOSIS — Z01.818 ENCOUNTER FOR OTHER PREPROCEDURAL EXAMINATION: ICD-10-CM

## 2022-08-10 PROCEDURE — G0463: CPT

## 2022-08-10 RX ORDER — ESOMEPRAZOLE MAGNESIUM 40 MG/1
1 CAPSULE, DELAYED RELEASE ORAL
Qty: 0 | Refills: 0 | DISCHARGE

## 2022-08-10 RX ORDER — AMITRIPTYLINE HCL 25 MG
2 TABLET ORAL
Qty: 0 | Refills: 0 | DISCHARGE

## 2022-08-10 RX ORDER — SODIUM CHLORIDE 9 MG/ML
3 INJECTION INTRAMUSCULAR; INTRAVENOUS; SUBCUTANEOUS EVERY 8 HOURS
Refills: 0 | Status: DISCONTINUED | OUTPATIENT
Start: 2022-08-25 | End: 2022-08-25

## 2022-08-10 NOTE — H&P PST ADULT - RESPIRATORY
normal/clear to auscultation bilaterally/no wheezes/no rales/no rhonchi/no respiratory distress/no use of accessory muscles/airway patent/breath sounds equal/good air movement/respiratory distress

## 2022-08-10 NOTE — H&P PST ADULT - HISTORY OF PRESENT ILLNESS
46 yr old female with history of HTN, hyperlipidemia, GERD, presents with plantar fascial fibromatosis. Pt c/o of pain which radiates from her ankle up her leg bilaterally. Pt had injections to both feet with minimal relief and she takes Tylenol for relief. Pt had evaluation and is schedule for Endoscopic Plantar Fasciotomy bilateral feet on 8/25/2022.

## 2022-08-10 NOTE — H&P PST ADULT - FALL HARM RISK - UNIVERSAL INTERVENTIONS
Bed in lowest position, wheels locked, appropriate side rails in place/Call bell, personal items and telephone in reach/Instruct patient to call for assistance before getting out of bed or chair/[place] to call system/Physically safe environment - no spills, clutter or unnecessary equipment/Purposeful Proactive Rounding/Room/bathroom lighting operational, light cord in reach

## 2022-08-10 NOTE — H&P PST ADULT - ATTENDING COMMENTS
pt consents for plantar fascia release both feet by endoscopic or open means as necessary  risks and failure discusse din great detail   answered questions

## 2022-08-10 NOTE — H&P PST ADULT - PROBLEM SELECTOR PLAN 4
Schedule for Endoscopic Plantar Fasciotomy bilateral feet.      Pt instructed to be NPO the night before surgery except in am of surgery to take bp meds with sip of water and remain NPO. Pt provided with chlorhexidene 4% solution to wash 3 days including the day of surgery. Pt understood all instructions and answered all questions.    Stop Bang Score =1  Pt low risk for TANIYA.

## 2022-08-10 NOTE — H&P PST ADULT - PROBLEM SELECTOR PLAN 1
Pt instructed to take bp medication am of surgery with sip of water the morning of surgery. Pt provided with written instructions and pt to follow up with PCP.

## 2022-08-10 NOTE — H&P PST ADULT - NSICDXPASTMEDICALHX_GEN_ALL_CORE_FT
PAST MEDICAL HISTORY:  HLD (hyperlipidemia)     HTN (hypertension), benign     Migraine     Plantar fascial fibromatosis

## 2022-08-10 NOTE — H&P PST ADULT - NSICDXFAMILYHX_GEN_ALL_CORE_FT
FAMILY HISTORY:  Sibling  Still living? Yes, Estimated age: 57  Family history of diabetes mellitus (DM), Age at diagnosis: Age Unknown

## 2022-08-24 ENCOUNTER — TRANSCRIPTION ENCOUNTER (OUTPATIENT)
Age: 47
End: 2022-08-24

## 2022-08-25 ENCOUNTER — TRANSCRIPTION ENCOUNTER (OUTPATIENT)
Age: 47
End: 2022-08-25

## 2022-08-25 ENCOUNTER — INPATIENT (INPATIENT)
Facility: HOSPITAL | Age: 47
LOS: 2 days | Discharge: HOME HEALTH | DRG: 502 | End: 2022-08-28
Attending: INTERNAL MEDICINE | Admitting: INTERNAL MEDICINE
Payer: MEDICAID

## 2022-08-25 VITALS
SYSTOLIC BLOOD PRESSURE: 132 MMHG | HEIGHT: 60 IN | OXYGEN SATURATION: 98 % | RESPIRATION RATE: 16 BRPM | TEMPERATURE: 99 F | WEIGHT: 167.99 LBS | HEART RATE: 94 BPM | DIASTOLIC BLOOD PRESSURE: 85 MMHG

## 2022-08-25 DIAGNOSIS — Z98.890 OTHER SPECIFIED POSTPROCEDURAL STATES: Chronic | ICD-10-CM

## 2022-08-25 DIAGNOSIS — M72.2 PLANTAR FASCIAL FIBROMATOSIS: ICD-10-CM

## 2022-08-25 LAB
GLUCOSE BLDC GLUCOMTR-MCNC: 105 MG/DL — HIGH (ref 70–99)
GLUCOSE BLDC GLUCOMTR-MCNC: 114 MG/DL — HIGH (ref 70–99)
GLUCOSE BLDC GLUCOMTR-MCNC: 91 MG/DL — SIGNIFICANT CHANGE UP (ref 70–99)
HCG UR QL: NEGATIVE — SIGNIFICANT CHANGE UP

## 2022-08-25 RX ORDER — HYDROMORPHONE HYDROCHLORIDE 2 MG/ML
0.5 INJECTION INTRAMUSCULAR; INTRAVENOUS; SUBCUTANEOUS
Refills: 0 | Status: DISCONTINUED | OUTPATIENT
Start: 2022-08-25 | End: 2022-08-25

## 2022-08-25 RX ORDER — SODIUM CHLORIDE 9 MG/ML
1000 INJECTION, SOLUTION INTRAVENOUS
Refills: 0 | Status: DISCONTINUED | OUTPATIENT
Start: 2022-08-25 | End: 2022-08-27

## 2022-08-25 RX ORDER — ONDANSETRON 8 MG/1
4 TABLET, FILM COATED ORAL EVERY 8 HOURS
Refills: 0 | Status: DISCONTINUED | OUTPATIENT
Start: 2022-08-25 | End: 2022-08-28

## 2022-08-25 RX ORDER — OXYCODONE HYDROCHLORIDE 5 MG/1
5 TABLET ORAL ONCE
Refills: 0 | Status: DISCONTINUED | OUTPATIENT
Start: 2022-08-25 | End: 2022-08-25

## 2022-08-25 RX ORDER — AMITRIPTYLINE HCL 25 MG
1 TABLET ORAL
Qty: 0 | Refills: 0 | DISCHARGE

## 2022-08-25 RX ORDER — AMLODIPINE BESYLATE 2.5 MG/1
1 TABLET ORAL
Qty: 0 | Refills: 0 | DISCHARGE

## 2022-08-25 RX ORDER — HYDROMORPHONE HYDROCHLORIDE 2 MG/ML
0.5 INJECTION INTRAMUSCULAR; INTRAVENOUS; SUBCUTANEOUS ONCE
Refills: 0 | Status: DISCONTINUED | OUTPATIENT
Start: 2022-08-25 | End: 2022-08-25

## 2022-08-25 RX ORDER — KETOROLAC TROMETHAMINE 30 MG/ML
15 SYRINGE (ML) INJECTION EVERY 8 HOURS
Refills: 0 | Status: DISCONTINUED | OUTPATIENT
Start: 2022-08-25 | End: 2022-08-26

## 2022-08-25 RX ORDER — ATORVASTATIN CALCIUM 80 MG/1
1 TABLET, FILM COATED ORAL
Qty: 0 | Refills: 0 | DISCHARGE

## 2022-08-25 RX ORDER — METOCLOPRAMIDE HCL 10 MG
10 TABLET ORAL ONCE
Refills: 0 | Status: DISCONTINUED | OUTPATIENT
Start: 2022-08-25 | End: 2022-08-25

## 2022-08-25 RX ORDER — OXYCODONE AND ACETAMINOPHEN 5; 325 MG/1; MG/1
1 TABLET ORAL EVERY 6 HOURS
Refills: 0 | Status: DISCONTINUED | OUTPATIENT
Start: 2022-08-25 | End: 2022-08-25

## 2022-08-25 RX ORDER — OXYCODONE AND ACETAMINOPHEN 5; 325 MG/1; MG/1
1 TABLET ORAL EVERY 6 HOURS
Refills: 0 | Status: DISCONTINUED | OUTPATIENT
Start: 2022-08-25 | End: 2022-08-26

## 2022-08-25 RX ORDER — LOSARTAN POTASSIUM 100 MG/1
1 TABLET, FILM COATED ORAL
Qty: 0 | Refills: 0 | DISCHARGE

## 2022-08-25 RX ORDER — METFORMIN HYDROCHLORIDE 850 MG/1
1 TABLET ORAL
Qty: 0 | Refills: 0 | DISCHARGE

## 2022-08-25 RX ORDER — PANTOPRAZOLE SODIUM 20 MG/1
40 TABLET, DELAYED RELEASE ORAL
Refills: 0 | Status: DISCONTINUED | OUTPATIENT
Start: 2022-08-25 | End: 2022-08-28

## 2022-08-25 RX ORDER — LANOLIN ALCOHOL/MO/W.PET/CERES
3 CREAM (GRAM) TOPICAL AT BEDTIME
Refills: 0 | Status: DISCONTINUED | OUTPATIENT
Start: 2022-08-25 | End: 2022-08-28

## 2022-08-25 RX ORDER — MORPHINE SULFATE 50 MG/1
2 CAPSULE, EXTENDED RELEASE ORAL ONCE
Refills: 0 | Status: DISCONTINUED | OUTPATIENT
Start: 2022-08-25 | End: 2022-08-25

## 2022-08-25 RX ORDER — OMEPRAZOLE 10 MG/1
1 CAPSULE, DELAYED RELEASE ORAL
Qty: 0 | Refills: 0 | DISCHARGE

## 2022-08-25 RX ADMIN — OXYCODONE AND ACETAMINOPHEN 1 TABLET(S): 5; 325 TABLET ORAL at 23:03

## 2022-08-25 RX ADMIN — OXYCODONE AND ACETAMINOPHEN 1 TABLET(S): 5; 325 TABLET ORAL at 16:45

## 2022-08-25 RX ADMIN — OXYCODONE AND ACETAMINOPHEN 1 TABLET(S): 5; 325 TABLET ORAL at 15:54

## 2022-08-25 RX ADMIN — HYDROMORPHONE HYDROCHLORIDE 0.5 MILLIGRAM(S): 2 INJECTION INTRAMUSCULAR; INTRAVENOUS; SUBCUTANEOUS at 21:51

## 2022-08-25 RX ADMIN — OXYCODONE HYDROCHLORIDE 5 MILLIGRAM(S): 5 TABLET ORAL at 19:31

## 2022-08-25 RX ADMIN — OXYCODONE HYDROCHLORIDE 5 MILLIGRAM(S): 5 TABLET ORAL at 18:44

## 2022-08-25 RX ADMIN — HYDROMORPHONE HYDROCHLORIDE 0.5 MILLIGRAM(S): 2 INJECTION INTRAMUSCULAR; INTRAVENOUS; SUBCUTANEOUS at 21:36

## 2022-08-25 NOTE — PATIENT PROFILE ADULT - HAVE YOU HAD A SECOND COVID-19 BOOSTER?
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 (CMS/HCC)   • Malignant neoplasm of colon (CMS/HCC)   • ETOH abuse       Allergies: ALLERGIES:  No Known Allergies    ROS: 10 point ROS done and negative except as above      Physical Exam    General - NAD   HEENT: NC, AT, MMM  CV - RRR No edema  Pulm - CTA BL  Abd: soft, ND, NT, no hepatosplenomegaly  Psych: alert  Neuro: CN 2-12 WNL  Ski: no rash, warm           Medications/Infusions: Reviewed                  Vital Last Value 24 Hour Range   Temperature 97.9 °F (36.6 °C) (19) Temp  Min: 97.9 °F (36.6 °C)  Max: 98.2 °F (36.8 °C)   Pulse 53 (19) Pulse  Min: 53  Max: 60   Respiratory 18 (19) Resp  Min: 16  Max: 18   Non-Invasive  Blood Pressure 140/74 (19 0525) BP  Min: 113/59  Max: 183/81   Pulse Oximetry 99 % (19) SpO2  Min: 98 %  Max: 100 %     Vital Today Admitted   Weight 73.7 kg (19) Weight: 72 kg (19)   Height N/A Height: 5' 11\" (180.3 cm) (19)   BMI N/A BMI (Calculated): 22.14 (19)       Intake/Output:      Intake/Output Summary (Last 24 hours) at 2019 1228  Last data filed at 2019 0436  Gross per 24 hour   Intake 0 ml   Output 550 ml   Net -550 ml               Laboratory Results:   Recent Labs   Lab 19  0506   WBC 4.3   HCT 30.4*   HGB 10.2*   PLT 76*   SODIUM 135   POTASSIUM  4.2   CHLORIDE 106   CO2 23   CALCIUM 8.9   GLUCOSE 179*   BUN 15   CREATININE 0.88   GFRNA 86       Imaging: No results found.               Assessment :     Fever  1. ETOH abuse  2. Cognitive impairment  3. Colon CA, s/p partial colectomy.  4. Dyslipidemia  5. HTN  6. DM Type II  7. Thrombocytopenia  8. Constipation  9. Insomnia       Plan:  Has fever but rather asymptomatic  Will start diagnostic sepsis protocol, check CXR, UA, blood clx, lactate, UA  Watch for any other localizing signs  Watch BG  BG improved, HgA1C is back at 6.5, try not to overtreat hyperglycemia  Cont stool softener  Scheduled rozerem     following for placement. Unable to reach dtr/guardian, pursuing alternative; hearing scheduled for June 27      Shahrzad Schmid MD  [phone]  6/1/2019      From 7 pm to 7 am please call on call pager: [phone].   No

## 2022-08-25 NOTE — ASU PATIENT PROFILE, ADULT - FALL HARM RISK - UNIVERSAL INTERVENTIONS
Bed in lowest position, wheels locked, appropriate side rails in place/Call bell, personal items and telephone in reach/Instruct patient to call for assistance before getting out of bed or chair/Non-slip footwear when patient is out of bed/[place] to call system/Physically safe environment - no spills, clutter or unnecessary equipment/Purposeful Proactive Rounding/Room/bathroom lighting operational, light cord in reach

## 2022-08-25 NOTE — ASU DISCHARGE PLAN (ADULT/PEDIATRIC) - NS MD DC FALL RISK RISK
For information on Fall & Injury Prevention, visit: [email or web address] OR  [email or web address] OR  https://www.cdc.gov/steadi/patient.html

## 2022-08-25 NOTE — ASU PATIENT PROFILE, ADULT - MEDICATION HERBAL REMEDIES, PROFILE
Bill For Surgical Tray: no Billing Type: Third-Party Bill Expected Date Of Service: 06/28/2021 Performing Laboratory: -[id number] no

## 2022-08-25 NOTE — PATIENT PROFILE ADULT - FALL HARM RISK - HARM RISK INTERVENTIONS

## 2022-08-25 NOTE — BRIEF OPERATIVE NOTE - NSICDXBRIEFOPLAUNCH_GEN_ALL_CORE
<--- Click to Launch ICDx for PreOp, PostOp and Procedure
I was present for and supervised the key and critical aspects of the procedures performed during the care of the patient. ATTENDING NOTE: 72 y/o F PMH DM, A-Fib on Xarelto, COPD on 2L of home O2 and HLD presents for SOB x2 days. Pt states that the SOB has been worse this morning. Pt obtained 2 nebulizers and decadron from the ambulance without improvement. Pt also notes she has been having CP and cough associated with the SOB. Pt is a current smoker and has no other symptoms such as fevers, chills, n/v/d. On exam: NCAT. PERRLA, EOMI. OP clear. Lungs (+)diffuse wheezing. RRR, S1S2 noted. Radial pulses 2+ and equal, pedal pulses 2+ and equal. Abdomen soft, NT/ND, no rebound or guarding. FROM x4 extremities. No focal neuro deficits. Will give additional nebs and O2.

## 2022-08-25 NOTE — PATIENT PROFILE ADULT - TOBACCO USE
Department of Anesthesiology  Postprocedure Note    Patient: Edward Daigle  MRN: [id number]  YOB: 1953  Date of evaluation: 6/8/2022  Time:  1:45 PM     Procedure Summary     Date: 06/08/22 Room / Location: [address]    Anesthesia Start: 1264 Anesthesia Stop: 2449    Procedure: EGD BAND LIGATION (N/A ) Diagnosis:       Rectal bleed      (rectal bleed)    Surgeons: Elisha Amador MD Responsible Provider: Isabel Lyn DO    Anesthesia Type: MAC ASA Status: 4          Anesthesia Type: No value filed. Shima Phase I:      Shima Phase II: Shima Score: 10    Last vitals: Reviewed and per EMR flowsheets. Anesthesia Post Evaluation    Patient location during evaluation: bedside  Patient participation: complete - patient participated  Level of consciousness: awake  Pain score: 2  Airway patency: patent  Nausea & Vomiting: no vomiting and no nausea  Complications: no  Cardiovascular status: hemodynamically stable  Respiratory status: acceptable  Hydration status: stable  Comments: Seen and examined. Progressing well. Bands x 2 without sequelae. No questions. Never smoker

## 2022-08-25 NOTE — PHYSICAL THERAPY INITIAL EVALUATION ADULT - LIVES WITH, PROFILE
with sister in a house with stairs. The patient reports that she can stay in bedroom on first floor to avoid stairs

## 2022-08-25 NOTE — PHYSICAL THERAPY INITIAL EVALUATION ADULT - GENERAL OBSERVATIONS, REHAB EVAL
pt received semi-reclined in stretcher, in NAD. Reports 5/10 pain at rest; patient pre-medicated by RN with percocet. Agreeable to performing PT evaluation

## 2022-08-26 DIAGNOSIS — Z29.9 ENCOUNTER FOR PROPHYLACTIC MEASURES, UNSPECIFIED: ICD-10-CM

## 2022-08-26 DIAGNOSIS — M79.673 PAIN IN UNSPECIFIED FOOT: ICD-10-CM

## 2022-08-26 DIAGNOSIS — D64.9 ANEMIA, UNSPECIFIED: ICD-10-CM

## 2022-08-26 DIAGNOSIS — I10 ESSENTIAL (PRIMARY) HYPERTENSION: ICD-10-CM

## 2022-08-26 DIAGNOSIS — E87.6 HYPOKALEMIA: ICD-10-CM

## 2022-08-26 DIAGNOSIS — M72.2 PLANTAR FASCIAL FIBROMATOSIS: ICD-10-CM

## 2022-08-26 DIAGNOSIS — E78.5 HYPERLIPIDEMIA, UNSPECIFIED: ICD-10-CM

## 2022-08-26 DIAGNOSIS — G43.909 MIGRAINE, UNSPECIFIED, NOT INTRACTABLE, WITHOUT STATUS MIGRAINOSUS: ICD-10-CM

## 2022-08-26 DIAGNOSIS — Z02.9 ENCOUNTER FOR ADMINISTRATIVE EXAMINATIONS, UNSPECIFIED: ICD-10-CM

## 2022-08-26 DIAGNOSIS — K59.00 CONSTIPATION, UNSPECIFIED: ICD-10-CM

## 2022-08-26 DIAGNOSIS — R73.03 PREDIABETES: ICD-10-CM

## 2022-08-26 LAB
ALBUMIN SERPL ELPH-MCNC: 3.8 G/DL — SIGNIFICANT CHANGE UP (ref 3.5–5)
ALP SERPL-CCNC: 67 U/L — SIGNIFICANT CHANGE UP (ref 40–120)
ALT FLD-CCNC: 33 U/L DA — SIGNIFICANT CHANGE UP (ref 10–60)
ANION GAP SERPL CALC-SCNC: 6 MMOL/L — SIGNIFICANT CHANGE UP (ref 5–17)
ANION GAP SERPL CALC-SCNC: 8 MMOL/L — SIGNIFICANT CHANGE UP (ref 5–17)
AST SERPL-CCNC: 21 U/L — SIGNIFICANT CHANGE UP (ref 10–40)
BILIRUB SERPL-MCNC: 0.5 MG/DL — SIGNIFICANT CHANGE UP (ref 0.2–1.2)
BUN SERPL-MCNC: 6 MG/DL — LOW (ref 7–18)
BUN SERPL-MCNC: 6 MG/DL — LOW (ref 7–18)
CALCIUM SERPL-MCNC: 8.7 MG/DL — SIGNIFICANT CHANGE UP (ref 8.4–10.5)
CALCIUM SERPL-MCNC: 8.8 MG/DL — SIGNIFICANT CHANGE UP (ref 8.4–10.5)
CHLORIDE SERPL-SCNC: 100 MMOL/L — SIGNIFICANT CHANGE UP (ref 96–108)
CHLORIDE SERPL-SCNC: 102 MMOL/L — SIGNIFICANT CHANGE UP (ref 96–108)
CO2 SERPL-SCNC: 29 MMOL/L — SIGNIFICANT CHANGE UP (ref 22–31)
CO2 SERPL-SCNC: 30 MMOL/L — SIGNIFICANT CHANGE UP (ref 22–31)
CREAT SERPL-MCNC: 0.5 MG/DL — SIGNIFICANT CHANGE UP (ref 0.5–1.3)
CREAT SERPL-MCNC: 0.62 MG/DL — SIGNIFICANT CHANGE UP (ref 0.5–1.3)
EGFR: 110 ML/MIN/1.73M2 — SIGNIFICANT CHANGE UP
EGFR: 116 ML/MIN/1.73M2 — SIGNIFICANT CHANGE UP
GLUCOSE BLDC GLUCOMTR-MCNC: 103 MG/DL — HIGH (ref 70–99)
GLUCOSE BLDC GLUCOMTR-MCNC: 108 MG/DL — HIGH (ref 70–99)
GLUCOSE BLDC GLUCOMTR-MCNC: 109 MG/DL — HIGH (ref 70–99)
GLUCOSE BLDC GLUCOMTR-MCNC: 122 MG/DL — HIGH (ref 70–99)
GLUCOSE BLDC GLUCOMTR-MCNC: 129 MG/DL — HIGH (ref 70–99)
GLUCOSE SERPL-MCNC: 115 MG/DL — HIGH (ref 70–99)
GLUCOSE SERPL-MCNC: 99 MG/DL — SIGNIFICANT CHANGE UP (ref 70–99)
HCT VFR BLD CALC: 34.3 % — LOW (ref 34.5–45)
HCT VFR BLD CALC: 36.1 % — SIGNIFICANT CHANGE UP (ref 34.5–45)
HGB BLD-MCNC: 11 G/DL — LOW (ref 11.5–15.5)
HGB BLD-MCNC: 11.7 G/DL — SIGNIFICANT CHANGE UP (ref 11.5–15.5)
MAGNESIUM SERPL-MCNC: 1.9 MG/DL — SIGNIFICANT CHANGE UP (ref 1.6–2.6)
MCHC RBC-ENTMCNC: 27.9 PG — SIGNIFICANT CHANGE UP (ref 27–34)
MCHC RBC-ENTMCNC: 28.5 PG — SIGNIFICANT CHANGE UP (ref 27–34)
MCHC RBC-ENTMCNC: 32.1 GM/DL — SIGNIFICANT CHANGE UP (ref 32–36)
MCHC RBC-ENTMCNC: 32.4 GM/DL — SIGNIFICANT CHANGE UP (ref 32–36)
MCV RBC AUTO: 87.1 FL — SIGNIFICANT CHANGE UP (ref 80–100)
MCV RBC AUTO: 88 FL — SIGNIFICANT CHANGE UP (ref 80–100)
NRBC # BLD: 0 /100 WBCS — SIGNIFICANT CHANGE UP (ref 0–0)
NRBC # BLD: 0 /100 WBCS — SIGNIFICANT CHANGE UP (ref 0–0)
PHOSPHATE SERPL-MCNC: 3.9 MG/DL — SIGNIFICANT CHANGE UP (ref 2.5–4.5)
PLATELET # BLD AUTO: 228 K/UL — SIGNIFICANT CHANGE UP (ref 150–400)
PLATELET # BLD AUTO: 232 K/UL — SIGNIFICANT CHANGE UP (ref 150–400)
POTASSIUM SERPL-MCNC: 3.2 MMOL/L — LOW (ref 3.5–5.3)
POTASSIUM SERPL-MCNC: 3.5 MMOL/L — SIGNIFICANT CHANGE UP (ref 3.5–5.3)
POTASSIUM SERPL-SCNC: 3.2 MMOL/L — LOW (ref 3.5–5.3)
POTASSIUM SERPL-SCNC: 3.5 MMOL/L — SIGNIFICANT CHANGE UP (ref 3.5–5.3)
PROT SERPL-MCNC: 7.7 G/DL — SIGNIFICANT CHANGE UP (ref 6–8.3)
RBC # BLD: 3.94 M/UL — SIGNIFICANT CHANGE UP (ref 3.8–5.2)
RBC # BLD: 4.1 M/UL — SIGNIFICANT CHANGE UP (ref 3.8–5.2)
RBC # FLD: 13.2 % — SIGNIFICANT CHANGE UP (ref 10.3–14.5)
RBC # FLD: 13.4 % — SIGNIFICANT CHANGE UP (ref 10.3–14.5)
SODIUM SERPL-SCNC: 136 MMOL/L — SIGNIFICANT CHANGE UP (ref 135–145)
SODIUM SERPL-SCNC: 139 MMOL/L — SIGNIFICANT CHANGE UP (ref 135–145)
WBC # BLD: 7.9 K/UL — SIGNIFICANT CHANGE UP (ref 3.8–10.5)
WBC # BLD: 8.04 K/UL — SIGNIFICANT CHANGE UP (ref 3.8–10.5)
WBC # FLD AUTO: 7.9 K/UL — SIGNIFICANT CHANGE UP (ref 3.8–10.5)
WBC # FLD AUTO: 8.04 K/UL — SIGNIFICANT CHANGE UP (ref 3.8–10.5)

## 2022-08-26 PROCEDURE — 99222 1ST HOSP IP/OBS MODERATE 55: CPT

## 2022-08-26 PROCEDURE — 12345: CPT | Mod: NC

## 2022-08-26 PROCEDURE — 99222 1ST HOSP IP/OBS MODERATE 55: CPT | Mod: GC

## 2022-08-26 RX ORDER — HYDROMORPHONE HYDROCHLORIDE 2 MG/ML
2 INJECTION INTRAMUSCULAR; INTRAVENOUS; SUBCUTANEOUS EVERY 4 HOURS
Refills: 0 | Status: DISCONTINUED | OUTPATIENT
Start: 2022-08-26 | End: 2022-08-26

## 2022-08-26 RX ORDER — AMLODIPINE BESYLATE 2.5 MG/1
5 TABLET ORAL AT BEDTIME
Refills: 0 | Status: DISCONTINUED | OUTPATIENT
Start: 2022-08-26 | End: 2022-08-26

## 2022-08-26 RX ORDER — INSULIN LISPRO 100/ML
VIAL (ML) SUBCUTANEOUS
Refills: 0 | Status: DISCONTINUED | OUTPATIENT
Start: 2022-08-26 | End: 2022-08-28

## 2022-08-26 RX ORDER — ACETAMINOPHEN 500 MG
1000 TABLET ORAL EVERY 6 HOURS
Refills: 0 | Status: DISCONTINUED | OUTPATIENT
Start: 2022-08-26 | End: 2022-08-26

## 2022-08-26 RX ORDER — GABAPENTIN 400 MG/1
300 CAPSULE ORAL
Refills: 0 | Status: DISCONTINUED | OUTPATIENT
Start: 2022-08-26 | End: 2022-08-27

## 2022-08-26 RX ORDER — GABAPENTIN 400 MG/1
300 CAPSULE ORAL DAILY
Refills: 0 | Status: DISCONTINUED | OUTPATIENT
Start: 2022-08-26 | End: 2022-08-26

## 2022-08-26 RX ORDER — AMITRIPTYLINE HCL 25 MG
25 TABLET ORAL AT BEDTIME
Refills: 0 | Status: DISCONTINUED | OUTPATIENT
Start: 2022-08-26 | End: 2022-08-28

## 2022-08-26 RX ORDER — POTASSIUM CHLORIDE 20 MEQ
10 PACKET (EA) ORAL
Refills: 0 | Status: COMPLETED | OUTPATIENT
Start: 2022-08-26 | End: 2022-08-26

## 2022-08-26 RX ORDER — AMLODIPINE BESYLATE 2.5 MG/1
5 TABLET ORAL AT BEDTIME
Refills: 0 | Status: DISCONTINUED | OUTPATIENT
Start: 2022-08-26 | End: 2022-08-28

## 2022-08-26 RX ORDER — GABAPENTIN 400 MG/1
100 CAPSULE ORAL THREE TIMES A DAY
Refills: 0 | Status: DISCONTINUED | OUTPATIENT
Start: 2022-08-26 | End: 2022-08-26

## 2022-08-26 RX ORDER — HYDROMORPHONE HYDROCHLORIDE 2 MG/ML
2 INJECTION INTRAMUSCULAR; INTRAVENOUS; SUBCUTANEOUS
Refills: 0 | Status: DISCONTINUED | OUTPATIENT
Start: 2022-08-26 | End: 2022-08-27

## 2022-08-26 RX ORDER — KETOROLAC TROMETHAMINE 30 MG/ML
15 SYRINGE (ML) INJECTION EVERY 8 HOURS
Refills: 0 | Status: DISCONTINUED | OUTPATIENT
Start: 2022-08-26 | End: 2022-08-26

## 2022-08-26 RX ORDER — HYDROMORPHONE HYDROCHLORIDE 2 MG/ML
0.5 INJECTION INTRAMUSCULAR; INTRAVENOUS; SUBCUTANEOUS ONCE
Refills: 0 | Status: DISCONTINUED | OUTPATIENT
Start: 2022-08-26 | End: 2022-08-26

## 2022-08-26 RX ORDER — DEXTROSE 50 % IN WATER 50 %
25 SYRINGE (ML) INTRAVENOUS ONCE
Refills: 0 | Status: DISCONTINUED | OUTPATIENT
Start: 2022-08-26 | End: 2022-08-28

## 2022-08-26 RX ORDER — KETOROLAC TROMETHAMINE 30 MG/ML
30 SYRINGE (ML) INJECTION EVERY 8 HOURS
Refills: 0 | Status: DISCONTINUED | OUTPATIENT
Start: 2022-08-26 | End: 2022-08-27

## 2022-08-26 RX ORDER — ATORVASTATIN CALCIUM 80 MG/1
40 TABLET, FILM COATED ORAL AT BEDTIME
Refills: 0 | Status: DISCONTINUED | OUTPATIENT
Start: 2022-08-26 | End: 2022-08-28

## 2022-08-26 RX ORDER — POLYETHYLENE GLYCOL 3350 17 G/17G
17 POWDER, FOR SOLUTION ORAL DAILY
Refills: 0 | Status: DISCONTINUED | OUTPATIENT
Start: 2022-08-26 | End: 2022-08-28

## 2022-08-26 RX ORDER — MORPHINE SULFATE 50 MG/1
2 CAPSULE, EXTENDED RELEASE ORAL EVERY 6 HOURS
Refills: 0 | Status: DISCONTINUED | OUTPATIENT
Start: 2022-08-26 | End: 2022-08-26

## 2022-08-26 RX ORDER — ACETAMINOPHEN 500 MG
1000 TABLET ORAL EVERY 8 HOURS
Refills: 0 | Status: DISCONTINUED | OUTPATIENT
Start: 2022-08-26 | End: 2022-08-28

## 2022-08-26 RX ORDER — SENNA PLUS 8.6 MG/1
2 TABLET ORAL AT BEDTIME
Refills: 0 | Status: DISCONTINUED | OUTPATIENT
Start: 2022-08-26 | End: 2022-08-28

## 2022-08-26 RX ORDER — MORPHINE SULFATE 50 MG/1
1 CAPSULE, EXTENDED RELEASE ORAL EVERY 8 HOURS
Refills: 0 | Status: DISCONTINUED | OUTPATIENT
Start: 2022-08-26 | End: 2022-08-26

## 2022-08-26 RX ORDER — GLUCAGON INJECTION, SOLUTION 0.5 MG/.1ML
1 INJECTION, SOLUTION SUBCUTANEOUS ONCE
Refills: 0 | Status: DISCONTINUED | OUTPATIENT
Start: 2022-08-26 | End: 2022-08-28

## 2022-08-26 RX ORDER — OXYCODONE AND ACETAMINOPHEN 5; 325 MG/1; MG/1
1 TABLET ORAL EVERY 6 HOURS
Refills: 0 | Status: DISCONTINUED | OUTPATIENT
Start: 2022-08-26 | End: 2022-08-26

## 2022-08-26 RX ORDER — LOSARTAN POTASSIUM 100 MG/1
25 TABLET, FILM COATED ORAL DAILY
Refills: 0 | Status: DISCONTINUED | OUTPATIENT
Start: 2022-08-26 | End: 2022-08-28

## 2022-08-26 RX ADMIN — LOSARTAN POTASSIUM 25 MILLIGRAM(S): 100 TABLET, FILM COATED ORAL at 06:04

## 2022-08-26 RX ADMIN — GABAPENTIN 300 MILLIGRAM(S): 400 CAPSULE ORAL at 18:12

## 2022-08-26 RX ADMIN — SENNA PLUS 2 TABLET(S): 8.6 TABLET ORAL at 21:15

## 2022-08-26 RX ADMIN — Medication 15 MILLIGRAM(S): at 08:34

## 2022-08-26 RX ADMIN — Medication 1000 MILLIGRAM(S): at 21:15

## 2022-08-26 RX ADMIN — MORPHINE SULFATE 2 MILLIGRAM(S): 50 CAPSULE, EXTENDED RELEASE ORAL at 06:32

## 2022-08-26 RX ADMIN — Medication 30 MILLILITER(S): at 22:19

## 2022-08-26 RX ADMIN — ONDANSETRON 4 MILLIGRAM(S): 8 TABLET, FILM COATED ORAL at 14:52

## 2022-08-26 RX ADMIN — Medication 30 MILLIGRAM(S): at 18:15

## 2022-08-26 RX ADMIN — MORPHINE SULFATE 2 MILLIGRAM(S): 50 CAPSULE, EXTENDED RELEASE ORAL at 00:36

## 2022-08-26 RX ADMIN — GABAPENTIN 300 MILLIGRAM(S): 400 CAPSULE ORAL at 12:09

## 2022-08-26 RX ADMIN — Medication 30 MILLILITER(S): at 06:05

## 2022-08-26 RX ADMIN — HYDROMORPHONE HYDROCHLORIDE 0.5 MILLIGRAM(S): 2 INJECTION INTRAMUSCULAR; INTRAVENOUS; SUBCUTANEOUS at 04:29

## 2022-08-26 RX ADMIN — SODIUM CHLORIDE 75 MILLILITER(S): 9 INJECTION, SOLUTION INTRAVENOUS at 01:35

## 2022-08-26 RX ADMIN — HYDROMORPHONE HYDROCHLORIDE 2 MILLIGRAM(S): 2 INJECTION INTRAMUSCULAR; INTRAVENOUS; SUBCUTANEOUS at 20:35

## 2022-08-26 RX ADMIN — PANTOPRAZOLE SODIUM 40 MILLIGRAM(S): 20 TABLET, DELAYED RELEASE ORAL at 06:04

## 2022-08-26 RX ADMIN — HYDROMORPHONE HYDROCHLORIDE 2 MILLIGRAM(S): 2 INJECTION INTRAMUSCULAR; INTRAVENOUS; SUBCUTANEOUS at 12:09

## 2022-08-26 RX ADMIN — MORPHINE SULFATE 2 MILLIGRAM(S): 50 CAPSULE, EXTENDED RELEASE ORAL at 00:03

## 2022-08-26 RX ADMIN — Medication 1000 MILLIGRAM(S): at 12:10

## 2022-08-26 RX ADMIN — AMLODIPINE BESYLATE 5 MILLIGRAM(S): 2.5 TABLET ORAL at 21:14

## 2022-08-26 RX ADMIN — MORPHINE SULFATE 1 MILLIGRAM(S): 50 CAPSULE, EXTENDED RELEASE ORAL at 01:34

## 2022-08-26 RX ADMIN — HYDROMORPHONE HYDROCHLORIDE 0.5 MILLIGRAM(S): 2 INJECTION INTRAMUSCULAR; INTRAVENOUS; SUBCUTANEOUS at 04:05

## 2022-08-26 RX ADMIN — MORPHINE SULFATE 2 MILLIGRAM(S): 50 CAPSULE, EXTENDED RELEASE ORAL at 06:04

## 2022-08-26 RX ADMIN — SODIUM CHLORIDE 75 MILLILITER(S): 9 INJECTION, SOLUTION INTRAVENOUS at 18:17

## 2022-08-26 RX ADMIN — Medication 100 MILLIEQUIVALENT(S): at 05:39

## 2022-08-26 RX ADMIN — SODIUM CHLORIDE 75 MILLILITER(S): 9 INJECTION, SOLUTION INTRAVENOUS at 12:14

## 2022-08-26 RX ADMIN — Medication 30 MILLIGRAM(S): at 18:41

## 2022-08-26 RX ADMIN — HYDROMORPHONE HYDROCHLORIDE 2 MILLIGRAM(S): 2 INJECTION INTRAMUSCULAR; INTRAVENOUS; SUBCUTANEOUS at 21:35

## 2022-08-26 RX ADMIN — Medication 3 MILLIGRAM(S): at 00:03

## 2022-08-26 RX ADMIN — Medication 25 MILLIGRAM(S): at 21:15

## 2022-08-26 RX ADMIN — Medication 15 MILLIGRAM(S): at 09:00

## 2022-08-26 RX ADMIN — Medication 1000 MILLIGRAM(S): at 13:00

## 2022-08-26 RX ADMIN — MORPHINE SULFATE 1 MILLIGRAM(S): 50 CAPSULE, EXTENDED RELEASE ORAL at 01:52

## 2022-08-26 RX ADMIN — Medication 100 MILLIEQUIVALENT(S): at 01:34

## 2022-08-26 RX ADMIN — OXYCODONE AND ACETAMINOPHEN 1 TABLET(S): 5; 325 TABLET ORAL at 00:02

## 2022-08-26 RX ADMIN — ATORVASTATIN CALCIUM 40 MILLIGRAM(S): 80 TABLET, FILM COATED ORAL at 21:15

## 2022-08-26 RX ADMIN — POLYETHYLENE GLYCOL 3350 17 GRAM(S): 17 POWDER, FOR SOLUTION ORAL at 12:10

## 2022-08-26 RX ADMIN — Medication 100 MILLIEQUIVALENT(S): at 03:45

## 2022-08-26 RX ADMIN — HYDROMORPHONE HYDROCHLORIDE 2 MILLIGRAM(S): 2 INJECTION INTRAMUSCULAR; INTRAVENOUS; SUBCUTANEOUS at 13:00

## 2022-08-26 NOTE — H&P ADULT - HISTORY OF PRESENT ILLNESS
This is a 47 yo F with PMHx of HTN, HLD, fatty liver disease, GERD, Migraines, arthritis, chronic constipation, and anemia who presents s/p b/l plantar fasciotomy. Pt states for a year she had increasing pain while walking which she received cortisone injections for, which did not help. She had a bilateral plantar fasciotomy done 8/25/22. Pt complains of b/l pain at surgical site that radiates up her leg, 9/10. Pt denies chills, SOB, CP, headache, N/V/D, dysuria, or numbness. This is a 47 yo F with PMHx of HTN, HLD, fatty liver disease, GERD, Migraines, arthritis, chronic constipation, and anemia who presents s/p b/l plantar fasciotomy. Pt states for a year she had increasing pain while walking which she received cortisone injections for, which did not help. She had a bilateral plantar fasciotomy done 8/25/22. Pt complains of b/l pain at surgical site, 9/10 in intensity. Pt denies chills, SOB, CP, headache, N/V/D, dysuria, or numbness.

## 2022-08-26 NOTE — CHART NOTE - NSCHARTNOTEFT_GEN_A_CORE
Patient seen and examined;  46 F with Hx Pre-DM on Metformin 500 mg BID, HTN on Amlodipine, Peripheral Neuropathy on Gabapentin 300 mg daily and Amitriptyline 25 mg Bedtime s/p Sx for B/L Plantar fascitis discharged home admitted with severe intractable pain in both legs.   Plan:  Increase gabapentin to 300 mg BID; Continue Amitryptiline  Pain mgmt consult: Tylenol round the clock  Opiuates for severe Pain  Hx Constipation will give Miralax daily with Lefty GRIFFIN Patient seen and examined;  46 F with Hx Pre-DM on Metformin 500 mg BID, HTN on Amlodipine, Peripheral Neuropathy on Gabapentin 300 mg daily and Amitriptyline 25 mg Bedtime s/p Sx for B/L Plantar fasciotomy discharged home admitted with severe intractable pain in both legs.   Plan:  Increase gabapentin to 300 mg BID; Continue Amitriptyline 25 mg HS  Pain mgmt consult: Tylenol round the clock  Opiates for severe Pain  Hx Constipation will give Miralax daily with Senna HS; If no daily BM add lactulose/ PRN Enema  Patient is from  but has dual citizenship and living with Brother and sister currently.   If renal fn stable can resume Metformin AM    Discussed with patient findings and plan of care  Discussed with KATRIN Dinero and Pain mgmt KATRIN Martinez and KARLENE Loredo

## 2022-08-26 NOTE — CHART NOTE - NSCHARTNOTEFT_GEN_A_CORE
Reassessed pt at bedside. Pt laying in bed. Reports b/l plantar pain 7-8/10 and not tolerable. Describes pain as constant burning, alleviated by pain medication for 3hrs. Requesting PRN pain medication. Last received dilaudid 2mg PO at 12. Pt also reports nausea. States heartburn and headache have improved. Vitals stable.   Vital Signs Last 24 Hrs  T(C): 36.8 (26 Aug 2022 13:16), Max: 37 (26 Aug 2022 05:00)  T(F): 98.2 (26 Aug 2022 13:16), Max: 98.6 (26 Aug 2022 05:00)  HR: 88 (26 Aug 2022 14:18) (79 - 96)  BP: 141/84 (26 Aug 2022 13:16) (107/67 - 176/99)  BP(mean): 101 (25 Aug 2022 21:51) (80 - 104)  RR: 18 (26 Aug 2022 13:16) (16 - 19)  SpO2: 98% (26 Aug 2022 14:18) (94% - 100%)    Parameters below as of 26 Aug 2022 14:18  Patient On (Oxygen Delivery Method): room air  Will adjust pain medications to dilaudid 2mg PO q3h PRN severe pain. RN to administer PRN zofran now.

## 2022-08-26 NOTE — H&P ADULT - PROBLEM SELECTOR PLAN 9
holding DVT ppx for now due to recent procedure, POD 0 holding DVT ppx for now due to recent procedure, POD 0  PPI

## 2022-08-26 NOTE — PROGRESS NOTE ADULT - SUBJECTIVE AND OBJECTIVE BOX
Patient is a 46y old  Female who presents with a chief complaint of b/l plantar fasciotomy (26 Aug 2022 09:03) had a procedure done 8/25/22 and complains of b/l pain at surgical site, 9/10 in intensity. Pt denies chills, SOB, CP, headache, N/V/D, dysuria, or numbness.        HPI:  This is a 47 yo F with PMHx of HTN, HLD, fatty liver disease, GERD, Migraines, arthritis, chronic constipation, and anemia who presents s/p b/l plantar fasciotomy. Pt states for a year she had increasing pain while walking which she received cortisone injections for, which did not help. She had a bilateral plantar fasciotomy done 8/25/22. P    Podiatry HPI    PMH:HTN (hypertension), benign    HLD (hyperlipidemia)    Migraine    Plantar fascial fibromatosis    Obese      Allergies: apple (Short breath)  Cherries (Short breath)  fruit (Unknown)  penicillin (Hives; Urticaria)    Medications: acetaminophen     Tablet .. 1000 milliGRAM(s) Oral every 8 hours  aluminum hydroxide/magnesium hydroxide/simethicone Suspension 30 milliLiter(s) Oral every 4 hours PRN  amitriptyline 25 milliGRAM(s) Oral at bedtime  amLODIPine   Tablet 5 milliGRAM(s) Oral at bedtime  atorvastatin 40 milliGRAM(s) Oral at bedtime  dextrose 50% Injectable 25 Gram(s) IV Push once  gabapentin 300 milliGRAM(s) Oral two times a day  glucagon  Injectable 1 milliGRAM(s) IntraMuscular once  HYDROmorphone   Tablet 2 milliGRAM(s) Oral every 4 hours PRN  insulin lispro (ADMELOG) corrective regimen sliding scale   SubCutaneous Before meals and at bedtime  ketorolac   Injectable 30 milliGRAM(s) IV Push every 8 hours  lactated ringers. 1000 milliLiter(s) IV Continuous <Continuous>  losartan 25 milliGRAM(s) Oral daily  melatonin 3 milliGRAM(s) Oral at bedtime PRN  ondansetron Injectable 4 milliGRAM(s) IV Push every 8 hours PRN  pantoprazole    Tablet 40 milliGRAM(s) Oral before breakfast  polyethylene glycol 3350 17 Gram(s) Oral daily  senna 2 Tablet(s) Oral at bedtime    FH:Family history of diabetes mellitus (DM) (Sibling)    FH: lung cancer      PSX: No significant past surgical history    H/O varicose vein stripping      SH: acetaminophen     Tablet .. 1000 milliGRAM(s) Oral every 8 hours  aluminum hydroxide/magnesium hydroxide/simethicone Suspension 30 milliLiter(s) Oral every 4 hours PRN  amitriptyline 25 milliGRAM(s) Oral at bedtime  amLODIPine   Tablet 5 milliGRAM(s) Oral at bedtime  atorvastatin 40 milliGRAM(s) Oral at bedtime  dextrose 50% Injectable 25 Gram(s) IV Push once  gabapentin 300 milliGRAM(s) Oral two times a day  glucagon  Injectable 1 milliGRAM(s) IntraMuscular once  HYDROmorphone   Tablet 2 milliGRAM(s) Oral every 4 hours PRN  insulin lispro (ADMELOG) corrective regimen sliding scale   SubCutaneous Before meals and at bedtime  ketorolac   Injectable 30 milliGRAM(s) IV Push every 8 hours  lactated ringers. 1000 milliLiter(s) IV Continuous <Continuous>  losartan 25 milliGRAM(s) Oral daily  melatonin 3 milliGRAM(s) Oral at bedtime PRN  ondansetron Injectable 4 milliGRAM(s) IV Push every 8 hours PRN  pantoprazole    Tablet 40 milliGRAM(s) Oral before breakfast  polyethylene glycol 3350 17 Gram(s) Oral daily  senna 2 Tablet(s) Oral at bedtime      Vital Signs Last 24 Hrs  T(C): 37 (26 Aug 2022 05:00), Max: 37 (26 Aug 2022 05:00)  T(F): 98.6 (26 Aug 2022 05:00), Max: 98.6 (26 Aug 2022 05:00)  HR: 89 (26 Aug 2022 05:00) (79 - 108)  BP: 145/70 (26 Aug 2022 05:00) (107/67 - 176/99)  BP(mean): 101 (25 Aug 2022 21:51) (80 - 106)  RR: 19 (26 Aug 2022 05:00) (16 - 19)  SpO2: 95% (26 Aug 2022 05:00) (95% - 100%)    Parameters below as of 25 Aug 2022 22:36  Patient On (Oxygen Delivery Method): room air        LABS                        11.0   8.04  )-----------( 228      ( 26 Aug 2022 05:41 )             34.3               08-26    136  |  100  |  6<L>  ----------------------------<  115<H>  3.5   |  30  |  0.50    Ca    8.7      26 Aug 2022 05:41  Phos  3.9     08-26  Mg     1.9     08-26    TPro  7.7  /  Alb  3.8  /  TBili  0.5  /  DBili  x   /  AST  21  /  ALT  33  /  AlkPhos  67  08-26      ROS  REVIEW OF SYSTEMS:    CONSTITUTIONAL: No fever, weight loss, or fatigue  EYES: No eye pain, visual disturbances, or discharge  ENMT:  No difficulty hearing, tinnitus, vertigo; No sinus or throat pain  NECK: No pain or stiffness  BREASTS: No pain, masses, or nipple discharge  RESPIRATORY: No cough, wheezing, chills or hemoptysis; No shortness of breath  CARDIOVASCULAR: No chest pain, palpitations, dizziness, or leg swelling  GASTROINTESTINAL: No abdominal or epigastric pain. No nausea, vomiting, or hematemesis; No diarrhea or constipation. No melena or hematochezia.  GENITOURINARY: No dysuria, frequency, hematuria, or incontinence  NEUROLOGICAL: No headaches, memory loss, loss of strength, numbness, or tremors  SKIN: No itching, burning, rashes, or lesions   LYMPH NODES: No enlarged glands  ENDOCRINE: No heat or cold intolerance; No hair loss  MUSCULOSKELETAL: No joint pain or swelling; No muscle, back, or extremity pain  PSYCHIATRIC: No depression, anxiety, mood swings, or difficulty sleeping  HEME/LYMPH: No easy bruising, or bleeding gums  ALLERY AND IMMUNOLOGIC: No hives or eczema      PHYSICAL EXAM  LE Focused:    Vasc:  Dp and Pt palpable  Derm: B/L stab incisoins to medial and lateral calcaneus, sutures intact   Neuro: protective sensation intact  MSK: Deferred due to pain, cannot tolerate wt bearing       IMAGING: ?xray    CULTURES:     A:      P:   Patient evaluated and Chart reviewed   Discussed diagnosis and treatment with patient  X-rays evaluated  Continue with IV pain meds per pain mgmt  Pt: Weight bearing as tolerated as pain resolves     Podiatry to follow while in house.  Discussed with Attending Dr perez

## 2022-08-26 NOTE — CONSULT NOTE ADULT - PROBLEM SELECTOR RECOMMENDATION 9
Pt with acute postop pain of b/l plantar feet which is somatic and neuropathic in nature due to plantar fascial fibromatosis s/p endoscopic plantar fasciotomy on 8/25, POD #1. Pt also with headache, hx migraines   Opioid pain recommendations   - Dilaudid 2mg PO q4h PRN severe pain. Monitor for sedation/ respiratory depression.   Non-opioid pain recommendations   - Toradol 30mg IVP q 8 hours x 2 days.   - Acetaminophen 1 gram PO q 6 hours x 3 days. Monitor LFTs  - Gabapentin 100mg po q 8 hours. Monitor renal function.   - Continue amitriptyline 25mg PO at bedtime. (home medication)   Bowel Regimen  - Continue Miralax 17G PO daily  - Continue Senna 2 tablets at bedtime for constipation  Mild pain   - Non-pharmacological pain treatment recommendations  - Warm/ Cool packs PRN   - Repositioning extremity, elevation, imagery, relaxation, distraction.  - Physical therapy OOB if no contraindications   Recommendations discussed with primary team and RN Pt with acute postop pain of b/l plantar feet which is somatic and neuropathic in nature due to plantar fascial fibromatosis s/p endoscopic plantar fasciotomy on 8/25, POD #1. Pt also with headache, hx migraines   Opioid pain recommendations   - Dilaudid 2mg PO q4h PRN severe pain. Monitor for sedation/ respiratory depression.   Non-opioid pain recommendations   - Toradol 30mg IVP q 8 hours x 2 days.   - Acetaminophen 1 gram PO q 8 hours x 3 days. Monitor LFTs  - Gabapentin 300mg po BID. Monitor renal function.   - Continue amitriptyline 25mg PO at bedtime. (home medication)   Bowel Regimen  - Continue Miralax 17G PO daily  - Continue Senna 2 tablets at bedtime for constipation  Mild pain   - Non-pharmacological pain treatment recommendations  - Warm/ Cool packs PRN   - Repositioning extremity, elevation, imagery, relaxation, distraction.  - Physical therapy OOB if no contraindications   Recommendations discussed with primary team and RN

## 2022-08-26 NOTE — H&P ADULT - PROBLEM SELECTOR PLAN 8
holding DVT ppx for now due to recent procedure, POD 0 On bowel regiment at home  C/w Senna and Miralax

## 2022-08-26 NOTE — H&P ADULT - NSICDXFAMILYHX_GEN_ALL_CORE_FT
FAMILY HISTORY:  FH: lung cancer    Sibling  Still living? Yes, Estimated age: 57  Family history of diabetes mellitus (DM), Age at diagnosis: Age Unknown

## 2022-08-26 NOTE — H&P ADULT - ASSESSMENT
This is a 45 yo F with PMHx of HTN, HLD, fatty liver disease, GERD, Migraines, arthritis, chronic constipation, and anemia who presents s/p b/l plantar fasciotomy. Admitted for post surgical monitoring.

## 2022-08-26 NOTE — H&P ADULT - PROBLEM SELECTOR PLAN 6
Pt states she is always anemic, home medications:  B12, Iron  C/w Home medications  Monitor H/H K+ 3.2  Will replete  Continue to monitor

## 2022-08-26 NOTE — CONSULT NOTE ADULT - SUBJECTIVE AND OBJECTIVE BOX
Source of information: DAWNA MCKAY, Chart review  Patient language: English  : n/a    HPI:  This is a 45 yo F with PMHx of HTN, HLD, fatty liver disease, GERD, Migraines, arthritis, chronic constipation, and anemia who presents s/p b/l plantar fasciotomy. Pt states for a year she had increasing pain while walking which she received cortisone injections for, which did not help. She had a bilateral plantar fasciotomy done 8/25/22. Pt complains of b/l pain at surgical site, 9/10 in intensity. Pt denies chills, SOB, CP, headache, N/V/D, dysuria, or numbness. (26 Aug 2022 00:01)      Dx plantar fascial fibromatosis s/p endoscopic plantar fasciotomy on 8/25, POD #1. Pain consulted for postop pain. Pt seen and examined at bedside. Reports b/l plantar pain score 5/10 SCALE USED: (1-10 VNRS). Pt describes pain as burning, occasionally radiating to b/l legs, alleviated slightly by IV pain medication, exacerbated by movement. Pt also reports migraine headache, 5/10 throbbing over right side of head, attributing headache to lack of sleep. Reports lethargy, dizziness, no vertigo, heartburn and generalized mild body itchiness. Pt tolerating PO diet. Denies  chest pain, SOB, nausea, vomiting, constipation. Patient stated goal for pain control: to be able to take deep breaths, get out of bed to chair and ambulate with tolerable pain control. Pt denies taking medications for pain at home.     PAST MEDICAL & SURGICAL HISTORY:  HTN (hypertension), benign      HLD (hyperlipidemia)      Migraine      Plantar fascial fibromatosis      H/O varicose vein stripping  bilateral          FAMILY HISTORY:  Family history of diabetes mellitus (DM) (Sibling)    FH: lung cancer        Social History:  Lives in [place] but comes to stay in the  sometimes. (26 Aug 2022 00:01)   [X ] Denies ETOH use, illicit drug use and smoking    Allergies    apple (Short breath)  Cherries (Short breath)  fruit (Unknown)  penicillin (Hives; Urticaria)    MEDICATIONS  (STANDING):  acetaminophen     Tablet .. 1000 milliGRAM(s) Oral every 6 hours  amitriptyline 25 milliGRAM(s) Oral at bedtime  amLODIPine   Tablet 5 milliGRAM(s) Oral at bedtime  atorvastatin 40 milliGRAM(s) Oral at bedtime  dextrose 50% Injectable 25 Gram(s) IV Push once  gabapentin 100 milliGRAM(s) Oral three times a day  glucagon  Injectable 1 milliGRAM(s) IntraMuscular once  insulin lispro (ADMELOG) corrective regimen sliding scale   SubCutaneous Before meals and at bedtime  ketorolac   Injectable 30 milliGRAM(s) IV Push every 8 hours  lactated ringers. 1000 milliLiter(s) (75 mL/Hr) IV Continuous <Continuous>  losartan 25 milliGRAM(s) Oral daily  pantoprazole    Tablet 40 milliGRAM(s) Oral before breakfast  polyethylene glycol 3350 17 Gram(s) Oral daily  senna 2 Tablet(s) Oral at bedtime    MEDICATIONS  (PRN):  aluminum hydroxide/magnesium hydroxide/simethicone Suspension 30 milliLiter(s) Oral every 4 hours PRN Dyspepsia  HYDROmorphone   Tablet 2 milliGRAM(s) Oral every 4 hours PRN Severe Pain (7 - 10)  melatonin 3 milliGRAM(s) Oral at bedtime PRN Insomnia  ondansetron Injectable 4 milliGRAM(s) IV Push every 8 hours PRN Nausea and/or Vomiting      Vital Signs Last 24 Hrs  T(C): 37 (26 Aug 2022 05:00), Max: 37 (25 Aug 2022 11:13)  T(F): 98.6 (26 Aug 2022 05:00), Max: 98.6 (25 Aug 2022 11:13)  HR: 89 (26 Aug 2022 05:00) (79 - 108)  BP: 145/70 (26 Aug 2022 05:00) (107/67 - 176/99)  BP(mean): 101 (25 Aug 2022 21:51) (80 - 106)  RR: 19 (26 Aug 2022 05:00) (16 - 19)  SpO2: 95% (26 Aug 2022 05:00) (95% - 100%)    Parameters below as of 25 Aug 2022 22:36  Patient On (Oxygen Delivery Method): room air        LABS: Reviewed.                          11.0   8.04  )-----------( 228      ( 26 Aug 2022 05:41 )             34.3     08-26    136  |  100  |  6<L>  ----------------------------<  115<H>  3.5   |  30  |  0.50    Ca    8.7      26 Aug 2022 05:41  Phos  3.9     08-26  Mg     1.9     08-26    TPro  7.7  /  Alb  3.8  /  TBili  0.5  /  DBili  x   /  AST  21  /  ALT  33  /  AlkPhos  67  08-26      LIVER FUNCTIONS - ( 26 Aug 2022 00:13 )  Alb: 3.8 g/dL / Pro: 7.7 g/dL / ALK PHOS: 67 U/L / ALT: 33 U/L DA / AST: 21 U/L / GGT: x             CAPILLARY BLOOD GLUCOSE      POCT Blood Glucose.: 109 mg/dL (26 Aug 2022 08:39)  POCT Blood Glucose.: 91 mg/dL (25 Aug 2022 18:40)  POCT Blood Glucose.: 105 mg/dL (25 Aug 2022 15:00)  POCT Blood Glucose.: 114 mg/dL (25 Aug 2022 10:59)    ORT Score -   Family Hx of substance abuse	Female	      Male  Alcohol 	                                           1                     3  Illegal drugs	                                   2                     3  Rx drugs                                           4 	                  4  Personal Hx of substance abuse		  Alcohol 	                                          3	                  3  Illegal drugs                                     4	                  4  Rx drugs                                            5 	                  5  Age between 16- 45 years	           1                     1  hx preadolescent sexual abuse	   3 	                  0  Psychological disease		  ADD, OCD, bipolar, schizophrenia   2	          2  Depression                                           1 	          1  Total: 0    a score of 3 or lower indicates low risk for opioid abuse		  a score of 4-7 indicates moderate risk for opioid abuse		  a score of 8 or higher indicates high risk for opioid abuse    REVIEW OF SYSTEMS:  CONSTITUTIONAL: No fever or fatigue + migraine headache, + dizziness   RESPIRATORY: No cough, wheezing, chills or hemoptysis; No shortness of breath  CARDIOVASCULAR: No chest pain, palpitations, dizziness, or leg swelling  GASTROINTESTINAL: No loss of appetite, decreased PO intake. + heartburn; No abdominal or epigastric pain. No nausea, vomiting; No diarrhea or constipation.   GENITOURINARY: No dysuria, frequency, hematuria, retention or incontinence  MUSCULOSKELETAL: + b/l plantar pain; No swelling; no upper or lower motor strength weakness, no saddle anesthesia, bowel/bladder incontinence, no falls   NEURO: No headaches, No numbness/tingling b/l LE, No weakness  SKIN: + generalized mild itchiness, no rash     PHYSICAL EXAM:  GENERAL:  Alert & Oriented X4, cooperative, NAD, Good concentration. Speech is clear.   RESPIRATORY: Respirations even and unlabored. Clear to auscultation bilaterally; No rales, rhonchi, wheezing, or rubs  CARDIOVASCULAR: Normal S1/S2, regular rate and rhythm; No murmurs, rubs, or gallops. No JVD.   GASTROINTESTINAL: + obese, Soft, Nontender, Nondistended; Bowel sounds present  PERIPHERAL VASCULAR:  Extremities warm without edema. 2+ Peripheral Pulses, No cyanosis, No calf tenderness  MUSCULOSKELETAL: Motor Strength 5/5 B/L upper and lower extremities; moves all extremities equally against gravity; ROM intact; negative SLR; + bl feet tenderness on palpation  SKIN: + b/l feet ace wrap c/d/i; Warm, dry, intact. No rashes, lesions, scars or wounds.     Risk factors associated with adverse outcomes related to opioid treatment  [ ]  Concurrent benzodiazepine use  [ ]  History/ Active substance use or alcohol use disorder  [ ] Psychiatric co-morbidity  [ ] Sleep apnea  [ ] COPD  [ ] BMI> 35  [ ] Liver dysfunction  [ ] Renal dysfunction  [ ] CHF  [ ] Smoker  [ ]  Age > 60 years    [X ]  [place]  Reviewed and Copied to Chart. See below.    Plan of care and goal oriented pain management treatment options were discussed with patient and /or primary care giver; all questions and concerns were addressed and care was aligned with patient's wishes.    Educated patient on goal oriented pain management treatment options

## 2022-08-26 NOTE — H&P ADULT - PROBLEM SELECTOR PLAN 1
H/o B/L plantar fasiciitis s/p b/l plantar fasciotomy, POD 0  Pain 9/10    C/w pain regiment    Morphine PRN q6  Oxycodone PRN q6  Dilaudid PRN q12 H/o B/L plantar fasciitis s/p b/l plantar fasciotomy, POD 0  Pain 9/10    C/w pain regiment    Morphine PRN q6  Oxycodone PRN q6  Dilaudid PRN q12 H/o B/L plantar fasciitis s/p b/l plantar fasciotomy, POD 0  Pain 9/10    C/w pain regiment    Oxycodone PRN q6  Morphine PRN q8 H/o B/L plantar fasciitis s/p b/l plantar fasciotomy, POD 0  Pain 9/10    C/w pain regiment    Oxycodone PRN q6  Morphine PRN q6  Pain management consult

## 2022-08-26 NOTE — PROGRESS NOTE ADULT - SUBJECTIVE AND OBJECTIVE BOX
NP Note discussed with  Primary Attending    Patient is a 46y old  Female who presents with a chief complaint of b/l plantar fasciotomy (26 Aug 2022 08:32)      INTERVAL HPI/OVERNIGHT EVENTS: no new complaints    MEDICATIONS  (STANDING):  amitriptyline 25 milliGRAM(s) Oral at bedtime  amLODIPine   Tablet 5 milliGRAM(s) Oral at bedtime  atorvastatin 40 milliGRAM(s) Oral at bedtime  dextrose 50% Injectable 25 Gram(s) IV Push once  glucagon  Injectable 1 milliGRAM(s) IntraMuscular once  insulin lispro (ADMELOG) corrective regimen sliding scale   SubCutaneous Before meals and at bedtime  lactated ringers. 1000 milliLiter(s) (75 mL/Hr) IV Continuous <Continuous>  losartan 25 milliGRAM(s) Oral daily  pantoprazole    Tablet 40 milliGRAM(s) Oral before breakfast  polyethylene glycol 3350 17 Gram(s) Oral daily  senna 2 Tablet(s) Oral at bedtime    MEDICATIONS  (PRN):  aluminum hydroxide/magnesium hydroxide/simethicone Suspension 30 milliLiter(s) Oral every 4 hours PRN Dyspepsia  ketorolac   Injectable 15 milliGRAM(s) IV Push every 8 hours PRN Moderate Pain (4 - 6)  melatonin 3 milliGRAM(s) Oral at bedtime PRN Insomnia  morphine  - Injectable 2 milliGRAM(s) IV Push every 6 hours PRN Severe Pain (7 - 10)  ondansetron Injectable 4 milliGRAM(s) IV Push every 8 hours PRN Nausea and/or Vomiting  oxycodone    5 mG/acetaminophen 325 mG 1 Tablet(s) Oral every 6 hours PRN Moderate Pain (4 - 6)      __________________________________________________  REVIEW OF SYSTEMS:    CONSTITUTIONAL: No fever,   EYES: no acute visual disturbances  NECK: No pain or stiffness  RESPIRATORY: No cough; No shortness of breath  CARDIOVASCULAR: No chest pain, no palpitations  GASTROINTESTINAL: No pain. No nausea or vomiting; No diarrhea   NEUROLOGICAL: No headache or numbness, no tremors  MUSCULOSKELETAL: pain to both feet surgical sites - pain medication works only 5-20minutes   GENITOURINARY: no dysuria, no frequency, no hesitancy  PSYCHIATRY: no depression , no anxiety  ALL OTHER  ROS negative        Vital Signs Last 24 Hrs  T(C): 37 (26 Aug 2022 05:00), Max: 37 (25 Aug 2022 11:13)  T(F): 98.6 (26 Aug 2022 05:00), Max: 98.6 (25 Aug 2022 11:13)  HR: 89 (26 Aug 2022 05:00) (79 - 108)  BP: 145/70 (26 Aug 2022 05:00) (107/67 - 176/99)  BP(mean): 101 (25 Aug 2022 21:51) (80 - 106)  RR: 19 (26 Aug 2022 05:00) (16 - 19)  SpO2: 95% (26 Aug 2022 05:00) (95% - 100%)    Parameters below as of 25 Aug 2022 22:36  Patient On (Oxygen Delivery Method): room air        ________________________________________________  PHYSICAL EXAM:  GENERAL: NAD  HEENT: Normocephalic;  conjunctivae and sclerae clear; moist mucous membranes;   NECK : supple  CHEST/LUNG: Clear to auscultation bilaterally with good air entry   HEART: S1 S2  regular; no murmurs, gallops or rubs  ABDOMEN: Soft, Nontender, Nondistended; Bowel sounds present  EXTREMITIES: no cyanosis; no edema; no calf tenderness + sensation + pain to both feet entire heel, cover with dry dressing   SKIN: warm and dry; no rash  NERVOUS SYSTEM:  Awake and alert; Oriented  to place, person and time ; no new deficits    _________________________________________________  LABS:                        11.0   8.04  )-----------( 228      ( 26 Aug 2022 05:41 )             34.3     08-26    136  |  100  |  6<L>  ----------------------------<  115<H>  3.5   |  30  |  0.50    Ca    8.7      26 Aug 2022 05:41  Phos  3.9     08-26  Mg     1.9     08-26    TPro  7.7  /  Alb  3.8  /  TBili  0.5  /  DBili  x   /  AST  21  /  ALT  33  /  AlkPhos  67  08-26        CAPILLARY BLOOD GLUCOSE      POCT Blood Glucose.: 109 mg/dL (26 Aug 2022 08:39)  POCT Blood Glucose.: 91 mg/dL (25 Aug 2022 18:40)  POCT Blood Glucose.: 105 mg/dL (25 Aug 2022 15:00)  POCT Blood Glucose.: 114 mg/dL (25 Aug 2022 10:59)        RADIOLOGY & ADDITIONAL TESTS:  < from: Xray Chest 1 View- PORTABLE-Urgent (Xray Chest 1 View- PORTABLE-Urgent .) (03.17.22 @ 01:57) >    ACC: [id number] EXAM:  XR CHEST PORTABLE URGENT 1V                          PROCEDURE DATE:  03/17/2022          INTERPRETATION:  Clinical history: 46-year-old female, chest pain.    Portable view of the chest without comparison demonstrates a normal  cardiac silhouette and normal pulmonary vasculature with no   consolidation, effusion, pneumothorax or acute osseous finding.    IMPRESSION:  No acute radiographic findings    --- End of Report ---            BISHNU SHER DO; Attending Radiologist  This document has been electronically signed. Mar 17 2022  1:27PM    < end of copied text >    Imaging Personally Reviewed:  YES    Consultant(s) Notes Reviewed:   YES    Care Discussed with Consultants : pain/ ortho    Plan of care was discussed with patient and /or primary care giver; all questions and concerns were addressed and care was aligned with patient's wishes.

## 2022-08-26 NOTE — PROGRESS NOTE ADULT - ASSESSMENT
This is a 47 yo F with PMHx of HTN, HLD, fatty liver disease, GERD, Migraines, arthritis, chronic constipation, and anemia who presents s/p b/l plantar fasciotomy. Admitted for pain management  Seen and examined pt at bedside, c/o severe pain, medications help only short period of time, unable to ambulate due to pain. Pain management and ortho consulted. will have PT eval again since pt cannot ambulate due to pain at this time.  This is a 47 yo F with PMHx of HTN, HLD, fatty liver disease, GERD, Migraines, arthritis, chronic constipation, and anemia who presents s/p b/l plantar fasciotomy. Admitted for pain management  Seen and examined pt at bedside, c/o severe pain, medications help only short period of time, unable to ambulate due to pain. Pain management and podiatry consulted. will have PT eval again since pt cannot ambulate due to pain at this time.

## 2022-08-26 NOTE — H&P ADULT - PROBLEM SELECTOR PLAN 5
Home medications:  Amitriptyline 20 qhs  C/w Home medications Home medications:  Amitriptyline 25 qhs  C/w Home medications

## 2022-08-26 NOTE — CONSULT NOTE ADULT - ASSESSMENT
Confidential Drug Utilization Report  Search Terms: Harriet Eller, 1975Search Date: 08/26/2022 08:33:21 AM  The Drug Utilization Report below displays all of the controlled substance prescriptions, if any, that your patient has filled in the last twelve months. The information displayed on this report is compiled from pharmacy submissions to the Department, and accurately reflects the information as submitted by the pharmacies.    This report was requested by: Michelle Quintero | Reference #: [id number]    There are no results for the search terms that you entered.

## 2022-08-26 NOTE — PROGRESS NOTE ADULT - PROBLEM SELECTOR PLAN 1
H/o B/L plantar fasciitis s/p b/l plantar fasciotomy, POD #1  Pain 9/10    C/w pain regiment    Percocet 5/325mg 1 tab  PRN q6  Morphine 2mg IVP PRN q6  Toradol  15mg IVP PRN q8  Pain management consulted to optimize pain control

## 2022-08-26 NOTE — H&P ADULT - NSHPPHYSICALEXAM_GEN_ALL_CORE
GENERAL: Mild Distress  HEAD:  Atraumatic, Normocephalic  EYES: EOMI, PERRLA, conjunctiva and sclera clear  NECK: Supple, No JVD  CHEST/LUNG: Clear to auscultation bilaterally; No wheeze  HEART: Regular rate and rhythm; No murmurs, rubs, or gallops  ABDOMEN: Soft, Nontender, Nondistended; Bowel sounds present  EXTREMITIES:  2+ Peripheral Pulses, No clubbing, cyanosis, or edema  PSYCH: AAOx3  NEUROLOGY: non-focal, extremities 5/5 strength, sensation intact  SKIN: No rashes or lesions. B/l plantar surgical sites

## 2022-08-26 NOTE — H&P ADULT - PROBLEM SELECTOR PLAN 7
holding DVT ppx for now due to recent procedure, POD 0 Pt states she is always anemic, home medications:  B12, Iron  C/w Home medications  Monitor H/H

## 2022-08-26 NOTE — H&P ADULT - ATTENDING COMMENTS
45 yo F with PMH of HTN, HLD, fatty liver disease, pre diabetes, GERD, Migraines, arthritis, chronic constipation, and anemia who presents s/p b/l plantar fasciotomy. Pt states for a year she had increasing pain while walking which she received cortisone injections for, which did not help. She had a bilateral plantar fasciotomy done 8/25/22. AS patient continued to c/o of severe surgical site pain bilateral foot, patient being admitted to medicine service for post op pain control.     AAOx3, in distress 2/2 pain pointing to plantar medial aspect of bilateral foot  chest CTA, RRR  abd soft, non tender nd  no leg edema, no calf tenderness  surgical dressing b/l feet                          11.0   8.04  )-----------( 228      ( 26 Aug 2022 05:41 )             34.3   08-26    139  |  102  |  6<L>  ----------------------------<  99  3.2<L>   |  29  |  0.62    Ca    8.8      26 Aug 2022 00:13  Phos  3.9     08-26  Mg     1.9     08-26    TPro  7.7  /  Alb  3.8  /  TBili  0.5  /  DBili  x   /  AST  21  /  ALT  33  /  AlkPhos  67  08-26      Assessment and plan: 45 yo F with PMH of HTN, HLD, fatty liver disease, pre diabetes, GERD, Migraines, arthritis, chronic constipation, and anemia who presents with severe surgical site pain bilateral feet s/p b/l plantar fasciotomy Aug 25,2022 admitted for  post op pain control.     surgical site pain bilateral feet  b/l plantar fasciotomy  Hypokalemia  HTN  HLD  Prediabetes  h/o migraines    -  patient with plantar fasciitis s/p b/l plantar fasciotomy  Aug 25,2022 , POD 0  - Oxycodone PRN q6, Morphine PRN q6, Toradol prn.   - bowel regimen, h/o constipation  - Pain management consult  - replace potassium  - resume home meds as above  - SCD for dvt ppx  - PPI

## 2022-08-27 LAB
ANION GAP SERPL CALC-SCNC: 9 MMOL/L — SIGNIFICANT CHANGE UP (ref 5–17)
BUN SERPL-MCNC: 6 MG/DL — LOW (ref 7–18)
CALCIUM SERPL-MCNC: 9.1 MG/DL — SIGNIFICANT CHANGE UP (ref 8.4–10.5)
CHLORIDE SERPL-SCNC: 102 MMOL/L — SIGNIFICANT CHANGE UP (ref 96–108)
CO2 SERPL-SCNC: 28 MMOL/L — SIGNIFICANT CHANGE UP (ref 22–31)
CREAT SERPL-MCNC: 0.59 MG/DL — SIGNIFICANT CHANGE UP (ref 0.5–1.3)
EGFR: 111 ML/MIN/1.73M2 — SIGNIFICANT CHANGE UP
GLUCOSE BLDC GLUCOMTR-MCNC: 106 MG/DL — HIGH (ref 70–99)
GLUCOSE BLDC GLUCOMTR-MCNC: 112 MG/DL — HIGH (ref 70–99)
GLUCOSE BLDC GLUCOMTR-MCNC: 118 MG/DL — HIGH (ref 70–99)
GLUCOSE SERPL-MCNC: 122 MG/DL — HIGH (ref 70–99)
POTASSIUM SERPL-MCNC: 3.7 MMOL/L — SIGNIFICANT CHANGE UP (ref 3.5–5.3)
POTASSIUM SERPL-SCNC: 3.7 MMOL/L — SIGNIFICANT CHANGE UP (ref 3.5–5.3)
SODIUM SERPL-SCNC: 139 MMOL/L — SIGNIFICANT CHANGE UP (ref 135–145)

## 2022-08-27 PROCEDURE — 99233 SBSQ HOSP IP/OBS HIGH 50: CPT

## 2022-08-27 RX ORDER — MINERAL OIL
133 OIL (ML) MISCELLANEOUS ONCE
Refills: 0 | Status: COMPLETED | OUTPATIENT
Start: 2022-08-27 | End: 2022-08-27

## 2022-08-27 RX ORDER — IBUPROFEN 200 MG
600 TABLET ORAL ONCE
Refills: 0 | Status: COMPLETED | OUTPATIENT
Start: 2022-08-27 | End: 2022-08-27

## 2022-08-27 RX ORDER — LACTULOSE 10 G/15ML
20 SOLUTION ORAL ONCE
Refills: 0 | Status: COMPLETED | OUTPATIENT
Start: 2022-08-27 | End: 2022-08-27

## 2022-08-27 RX ORDER — GABAPENTIN 400 MG/1
300 CAPSULE ORAL EVERY 8 HOURS
Refills: 0 | Status: DISCONTINUED | OUTPATIENT
Start: 2022-08-27 | End: 2022-08-28

## 2022-08-27 RX ORDER — GABAPENTIN 400 MG/1
300 CAPSULE ORAL ONCE
Refills: 0 | Status: COMPLETED | OUTPATIENT
Start: 2022-08-27 | End: 2022-08-27

## 2022-08-27 RX ADMIN — Medication 1000 MILLIGRAM(S): at 21:19

## 2022-08-27 RX ADMIN — GABAPENTIN 300 MILLIGRAM(S): 400 CAPSULE ORAL at 21:18

## 2022-08-27 RX ADMIN — HYDROMORPHONE HYDROCHLORIDE 2 MILLIGRAM(S): 2 INJECTION INTRAMUSCULAR; INTRAVENOUS; SUBCUTANEOUS at 06:51

## 2022-08-27 RX ADMIN — AMLODIPINE BESYLATE 5 MILLIGRAM(S): 2.5 TABLET ORAL at 21:19

## 2022-08-27 RX ADMIN — Medication 1000 MILLIGRAM(S): at 13:37

## 2022-08-27 RX ADMIN — Medication 1000 MILLIGRAM(S): at 05:40

## 2022-08-27 RX ADMIN — Medication 3 MILLIGRAM(S): at 23:29

## 2022-08-27 RX ADMIN — Medication 30 MILLILITER(S): at 22:03

## 2022-08-27 RX ADMIN — PANTOPRAZOLE SODIUM 40 MILLIGRAM(S): 20 TABLET, DELAYED RELEASE ORAL at 05:38

## 2022-08-27 RX ADMIN — Medication 1000 MILLIGRAM(S): at 14:00

## 2022-08-27 RX ADMIN — ATORVASTATIN CALCIUM 40 MILLIGRAM(S): 80 TABLET, FILM COATED ORAL at 21:18

## 2022-08-27 RX ADMIN — GABAPENTIN 300 MILLIGRAM(S): 400 CAPSULE ORAL at 17:02

## 2022-08-27 RX ADMIN — Medication 133 MILLILITER(S): at 18:15

## 2022-08-27 RX ADMIN — GABAPENTIN 300 MILLIGRAM(S): 400 CAPSULE ORAL at 05:40

## 2022-08-27 RX ADMIN — Medication 600 MILLIGRAM(S): at 19:07

## 2022-08-27 RX ADMIN — Medication 1000 MILLIGRAM(S): at 06:00

## 2022-08-27 RX ADMIN — Medication 30 MILLIGRAM(S): at 09:00

## 2022-08-27 RX ADMIN — Medication 600 MILLIGRAM(S): at 20:06

## 2022-08-27 RX ADMIN — LOSARTAN POTASSIUM 25 MILLIGRAM(S): 100 TABLET, FILM COATED ORAL at 05:39

## 2022-08-27 RX ADMIN — POLYETHYLENE GLYCOL 3350 17 GRAM(S): 17 POWDER, FOR SOLUTION ORAL at 13:37

## 2022-08-27 RX ADMIN — Medication 1000 MILLIGRAM(S): at 22:21

## 2022-08-27 RX ADMIN — HYDROMORPHONE HYDROCHLORIDE 2 MILLIGRAM(S): 2 INJECTION INTRAMUSCULAR; INTRAVENOUS; SUBCUTANEOUS at 07:00

## 2022-08-27 RX ADMIN — Medication 30 MILLIGRAM(S): at 08:16

## 2022-08-27 RX ADMIN — SENNA PLUS 2 TABLET(S): 8.6 TABLET ORAL at 21:19

## 2022-08-27 RX ADMIN — Medication 25 MILLIGRAM(S): at 21:19

## 2022-08-27 RX ADMIN — LACTULOSE 20 GRAM(S): 10 SOLUTION ORAL at 13:59

## 2022-08-27 NOTE — PROGRESS NOTE ADULT - SUBJECTIVE AND OBJECTIVE BOX
Patient seen and examined this morning around 11 AM.     46 F with Hx Pre-DM on Metformin 500 mg BID, HTN on Amlodipine, Peripheral Neuropathy on Gabapentin 300 mg daily and Amitriptyline 25 mg Bedtime s/p Sx for B/L Plantar fasciotomy discharged home admitted same day with severe intractable pain in both legs.     Patient is doing better today. Pain better controlled but stillneeded IV Toradol earlier this morning. Seen by PT recommended Home PT. patient requested a wheelchair to move around in the house and getting into her apartment as she has to walk quite a distance for same. She wishes to have better pain control prior to discharge.     MEDICATIONS  (STANDING):  acetaminophen     Tablet .. 1000 milliGRAM(s) Oral every 8 hours  amitriptyline 25 milliGRAM(s) Oral at bedtime  amLODIPine   Tablet 5 milliGRAM(s) Oral at bedtime  atorvastatin 40 milliGRAM(s) Oral at bedtime  dextrose 50% Injectable 25 Gram(s) IV Push once  gabapentin 300 milliGRAM(s) Oral every 8 hours  glucagon  Injectable 1 milliGRAM(s) IntraMuscular once  insulin lispro (ADMELOG) corrective regimen sliding scale   SubCutaneous Before meals and at bedtime  losartan 25 milliGRAM(s) Oral daily  pantoprazole    Tablet 40 milliGRAM(s) Oral before breakfast  polyethylene glycol 3350 17 Gram(s) Oral daily  senna 2 Tablet(s) Oral at bedtime    MEDICATIONS  (PRN):  aluminum hydroxide/magnesium hydroxide/simethicone Suspension 30 milliLiter(s) Oral every 4 hours PRN Dyspepsia  melatonin 3 milliGRAM(s) Oral at bedtime PRN Insomnia  ondansetron Injectable 4 milliGRAM(s) IV Push every 8 hours PRN Nausea and/or Vomiting      Vital Signs Last 24 Hrs  T(C): 36.4 (27 Aug 2022 21:25), Max: 37.1 (27 Aug 2022 05:36)  T(F): 97.6 (27 Aug 2022 21:25), Max: 98.7 (27 Aug 2022 05:36)  HR: 89 (27 Aug 2022 21:25) (88 - 92)  BP: 134/69 (27 Aug 2022 21:25) (115/78 - 146/79)  RR: 17 (27 Aug 2022 21:25) (16 - 17)  SpO2: 97% (27 Aug 2022 21:25) (96% - 99%)  room air    P/E:  Middle aged overweight female in mild distress due to pain  Psych: AAO x3  Neuro: No gross focal deficits; Power and sensation intact  CVS: S1S2 present, regular, no edema  Resp: BLAE+, No wheeze or Rhonchi  GI: Soft, BS+, Non tender, non distended  Extr: No  calf tenderness B/L Lower extremities  Skin: Warm and moist without any rashes         Patient seen and examined this morning around 11 AM.     46 F with Hx Pre-DM on Metformin 500 mg BID, HTN on Amlodipine, Peripheral Neuropathy on Gabapentin 300 mg daily and Amitriptyline 25 mg Bedtime s/p Sx for B/L Plantar fasciotomy discharged home admitted same day with severe intractable pain in both legs.     Patient is doing better today. Pain better controlled but stillneeded IV Toradol earlier this morning. Seen by PT recommended Home PT. patient requested a wheelchair to move around in the house and getting into her apartment as she has to walk quite a distance for same. She wishes to have better pain control prior to discharge.     MEDICATIONS  (STANDING):  acetaminophen     Tablet .. 1000 milliGRAM(s) Oral every 8 hours  amitriptyline 25 milliGRAM(s) Oral at bedtime  amLODIPine   Tablet 5 milliGRAM(s) Oral at bedtime  atorvastatin 40 milliGRAM(s) Oral at bedtime  dextrose 50% Injectable 25 Gram(s) IV Push once  gabapentin 300 milliGRAM(s) Oral every 8 hours  glucagon  Injectable 1 milliGRAM(s) IntraMuscular once  insulin lispro (ADMELOG) corrective regimen sliding scale   SubCutaneous Before meals and at bedtime  losartan 25 milliGRAM(s) Oral daily  pantoprazole    Tablet 40 milliGRAM(s) Oral before breakfast  polyethylene glycol 3350 17 Gram(s) Oral daily  senna 2 Tablet(s) Oral at bedtime    MEDICATIONS  (PRN):  aluminum hydroxide/magnesium hydroxide/simethicone Suspension 30 milliLiter(s) Oral every 4 hours PRN Dyspepsia  melatonin 3 milliGRAM(s) Oral at bedtime PRN Insomnia  ondansetron Injectable 4 milliGRAM(s) IV Push every 8 hours PRN Nausea and/or Vomiting      Vital Signs Last 24 Hrs  T(C): 36.4 (27 Aug 2022 21:25), Max: 37.1 (27 Aug 2022 05:36)  T(F): 97.6 (27 Aug 2022 21:25), Max: 98.7 (27 Aug 2022 05:36)  HR: 89 (27 Aug 2022 21:25) (88 - 92)  BP: 134/69 (27 Aug 2022 21:25) (115/78 - 146/79)  RR: 17 (27 Aug 2022 21:25) (16 - 17)  SpO2: 97% (27 Aug 2022 21:25) (96% - 99%)  room air    P/E:  Middle aged overweight female in mild distress due to pain  Psych: AAO x3  Neuro: No gross focal deficits; Power and sensation intact  CVS: S1S2 present, regular, no edema  Resp: BLAE+, No wheeze or Rhonchi  GI: Soft, BS+, Non tender, non distended  Extr: No  calf tenderness B/L Lower extremities  Skin: Warm and moist without any rashes    Labs:                        11.0   8.04  )-----------( 228      ( 26 Aug 2022 05:41 )             34.3   08-27    139  |  102  |  6<L>  ----------------------------<  122<H>  3.7   |  28  |  0.59    Ca    9.1      27 Aug 2022 06:00  Phos  3.9     08-26  Mg     1.9     08-26    TPro  7.7  /  Alb  3.8  /  TBili  0.5  /  DBili  x   /  AST  21  /  ALT  33  /  AlkPhos  67  08-26      CAPILLARY BLOOD GLUCOSE  POCT Blood Glucose.: 112 mg/dL (27 Aug 2022 21:33)  POCT Blood Glucose.: 118 mg/dL (27 Aug 2022 16:36)  POCT Blood Glucose.: 106 mg/dL (27 Aug 2022 11:41)

## 2022-08-27 NOTE — PROGRESS NOTE ADULT - SUBJECTIVE AND OBJECTIVE BOX
Podiatry interval: Pt was seen bedside A&Ox3 in no acute distress. Admits her pain is tolerable when she is medicated however; when she has to WB, her pain shoots up again. Admits her pain is a 2/10 today at bedside. Denies any acute trauma. Denies constitutional symptoms. Denies any other pedal complaints.     Patient is a 46y old  Female who presents with a chief complaint of b/l plantar fasciotomy (26 Aug 2022 09:03) had a procedure done 8/25/22 and complains of b/l pain at surgical site, 9/10 in intensity. Pt denies chills, SOB, CP, headache, N/V/D, dysuria, or numbness.    HPI:  This is a 45 yo F with PMHx of HTN, HLD, fatty liver disease, GERD, Migraines, arthritis, chronic constipation, and anemia who presents s/p b/l plantar fasciotomy. Pt states for a year she had increasing pain while walking which she received cortisone injections for, which did not help. She had a bilateral plantar fasciotomy done 8/25/22. P    Patient admits to  (-) Fevers, (-) Chills, (-) Nausea, (-) Vomiting, (-) Shortness of Breath (-) calf pain (-) chest pain     Medications acetaminophen     Tablet .. 1000 milliGRAM(s) Oral every 8 hours  aluminum hydroxide/magnesium hydroxide/simethicone Suspension 30 milliLiter(s) Oral every 4 hours PRN  amitriptyline 25 milliGRAM(s) Oral at bedtime  amLODIPine   Tablet 5 milliGRAM(s) Oral at bedtime  atorvastatin 40 milliGRAM(s) Oral at bedtime  dextrose 50% Injectable 25 Gram(s) IV Push once  gabapentin 300 milliGRAM(s) Oral two times a day  glucagon  Injectable 1 milliGRAM(s) IntraMuscular once  HYDROmorphone   Tablet 2 milliGRAM(s) Oral every 3 hours PRN  insulin lispro (ADMELOG) corrective regimen sliding scale   SubCutaneous Before meals and at bedtime  ketorolac   Injectable 30 milliGRAM(s) IV Push every 8 hours  losartan 25 milliGRAM(s) Oral daily  melatonin 3 milliGRAM(s) Oral at bedtime PRN  ondansetron Injectable 4 milliGRAM(s) IV Push every 8 hours PRN  pantoprazole    Tablet 40 milliGRAM(s) Oral before breakfast  polyethylene glycol 3350 17 Gram(s) Oral daily  senna 2 Tablet(s) Oral at bedtime    FHFamily history of diabetes mellitus (DM) (Sibling)    FH: lung cancer    ,   PMHHTN (hypertension), benign    HLD (hyperlipidemia)    Migraine    Plantar fascial fibromatosis    Obese       PSHNo significant past surgical history    H/O varicose vein stripping        Labs                          11.0   8.04  )-----------( 228      ( 26 Aug 2022 05:41 )             34.3      08-27    139  |  102  |  6<L>  ----------------------------<  122<H>  3.7   |  28  |  0.59    Ca    9.1      27 Aug 2022 06:00  Phos  3.9     08-26  Mg     1.9     08-26    TPro  7.7  /  Alb  3.8  /  TBili  0.5  /  DBili  x   /  AST  21  /  ALT  33  /  AlkPhos  67  08-26     Vital Signs Last 24 Hrs  T(C): 37.1 (27 Aug 2022 05:36), Max: 37.1 (27 Aug 2022 05:36)  T(F): 98.7 (27 Aug 2022 05:36), Max: 98.7 (27 Aug 2022 05:36)  HR: 88 (27 Aug 2022 10:27) (86 - 96)  BP: 140/68 (27 Aug 2022 10:27) (135/81 - 146/79)  BP(mean): --  RR: 16 (27 Aug 2022 05:36) (16 - 18)  SpO2: 98% (27 Aug 2022 10:27) (94% - 99%)    Parameters below as of 27 Aug 2022 05:36  Patient On (Oxygen Delivery Method): room air                  ROS unremarkable outside of HPI    PHYSICAL EXAM  LE Focused:    Vasc:  Dp and Pt palpable 2/4 b/l   Derm: B/L stab incisions to medial and lateral calcaneus, sutures intact with skin well coapted without drainage or signs of infection   Neuro: protective sensation intact b/l   MSK: Deferred due to pain, cannot tolerate wt bearing       IMAGING: ?xray    CULTURES:     A:      P:   Patient evaluated and Chart reviewed   Discussed diagnosis and treatment with patient  X-rays evaluated  Continue with IV pain meds per pain mgmt  Continue w/ PT recommendations   Pt: Weight bearing as tolerated as pain resolves   Podiatry to follow while in house.  Discussed with Attending Dr Higgins

## 2022-08-27 NOTE — PROGRESS NOTE ADULT - ASSESSMENT
Plan:  Increase gabapentin to 300 mg BID; Continue Amitriptyline 25 mg HS  Pain mgmt consult: Tylenol round the clock  Opiates for severe Pain  Hx Constipation will give Miralax daily with Senna HS; If no daily BM add lactulose/ PRN Enema  Patient is from  but has dual citizenship and living with Brother and sister currently.   If renal fn stable can resume Metformin AM    Discussed with patient findings and plan of care  Discussed with KATRIN Dinero and Pain mgmt KATRIN Martinez and KARLENE Loredo. D/D:  Intractable pain to both LE s/p B/L plantar fasciotomy  Constipated  Type 2 DM/ Pre diabetes stable  HTN controlled    Plan:  Continue Tylenol  Increase gabapentin to 300 mg TID; Continue Amitriptyline 25 mg HS  Will monitor off parenteral medications; d/w RN and patient to avoid   Opiates oral for severe Pain  Continue Miralax daily with Senyehuda HS; given lactulose/ PRN Enema this evening if no BM during the day  Patient is from  but has dual citizenship and living with Brother and sister currently. Spoke with brother who called the floor for update; discussed discharge tomorrow morning  d/w ; DME form signed for wheelchair if patient insurance covers  PT eval appreciated; Home PT; d/w   Resume Metformin on discharge once her sugars are elevated; sugars tightly controlled at present  DVT and GI ppx; continue Pepcid for 2 weeks while on pain med on discharge    Discussed with patient findings and plan of care at [facility] and updated D/C Home tomorrow  Discussed with ACP covering Raghavendra and KARLENE Loredo.

## 2022-08-27 NOTE — CHART NOTE - NSCHARTNOTEFT_GEN_A_CORE
event: Pt c/o worsening burning sensation in bilateral feet.     45 yo F with PMHx of HTN, HLD, fatty liver disease, GERD, Migraines, arthritis, chronic constipation, and anemia who presents s/p b/l plantar fasciotomy. Admitted for pain management  Seen and examined pt at bedside, c/o severe pain, medications help only short period of time, unable to ambulate due to pain. Pain management and podiatry consulted. will have PT eval again since pt cannot ambulate due to pain at this time.     #Bilateral plantar fasciitis.   h/o B/L plantar fasciitis s/p b/l plantar fasciotomy on 8/25  avoid narcotics due to worsening constipation  increase gabapentin 300 mg q8h  likely for discharge home am  monitor for improvement of pain  monitor BMP event: Pt c/o worsening burning sensation in bilateral feet.     45 yo F with PMHx of HTN, HLD, fatty liver disease, GERD, Migraines, arthritis, chronic constipation, and anemia who presents s/p b/l plantar fasciotomy. Admitted for pain management  Seen and examined pt at bedside, c/o severe pain, medications help only short period of time, unable to ambulate due to pain. Pain management and podiatry consulted. will have PT eval again since pt cannot ambulate due to pain at this time.     #Bilateral plantar fasciitis.   h/o B/L plantar fasciitis s/p b/l plantar fasciotomy on 8/25  increase gabapentin 300 mg q8h  monitor for improvement of pain  monitor BMP    #constipation  pt reports not having a BM in 3 days  continue lactulose  continue enema   avoid narcotics  monitor BM    #discharge planning issue  waiting for BM  PT re-eval  wheelchair/rolling walker on dc

## 2022-08-28 ENCOUNTER — TRANSCRIPTION ENCOUNTER (OUTPATIENT)
Age: 47
End: 2022-08-28

## 2022-08-28 VITALS
OXYGEN SATURATION: 100 % | RESPIRATION RATE: 18 BRPM | DIASTOLIC BLOOD PRESSURE: 90 MMHG | HEART RATE: 83 BPM | SYSTOLIC BLOOD PRESSURE: 142 MMHG | TEMPERATURE: 98 F

## 2022-08-28 LAB
GLUCOSE BLDC GLUCOMTR-MCNC: 101 MG/DL — HIGH (ref 70–99)
GLUCOSE BLDC GLUCOMTR-MCNC: 107 MG/DL — HIGH (ref 70–99)
GLUCOSE BLDC GLUCOMTR-MCNC: 109 MG/DL — HIGH (ref 70–99)

## 2022-08-28 PROCEDURE — 99239 HOSP IP/OBS DSCHRG MGMT >30: CPT

## 2022-08-28 PROCEDURE — 83735 ASSAY OF MAGNESIUM: CPT

## 2022-08-28 PROCEDURE — 81025 URINE PREGNANCY TEST: CPT

## 2022-08-28 PROCEDURE — 36415 COLL VENOUS BLD VENIPUNCTURE: CPT

## 2022-08-28 PROCEDURE — 80048 BASIC METABOLIC PNL TOTAL CA: CPT

## 2022-08-28 PROCEDURE — 97162 PT EVAL MOD COMPLEX 30 MIN: CPT

## 2022-08-28 PROCEDURE — 97110 THERAPEUTIC EXERCISES: CPT

## 2022-08-28 PROCEDURE — 85027 COMPLETE CBC AUTOMATED: CPT

## 2022-08-28 PROCEDURE — 82962 GLUCOSE BLOOD TEST: CPT

## 2022-08-28 PROCEDURE — 97530 THERAPEUTIC ACTIVITIES: CPT

## 2022-08-28 PROCEDURE — 84100 ASSAY OF PHOSPHORUS: CPT

## 2022-08-28 PROCEDURE — 80053 COMPREHEN METABOLIC PANEL: CPT

## 2022-08-28 RX ORDER — GABAPENTIN 400 MG/1
1 CAPSULE ORAL
Qty: 90 | Refills: 0
Start: 2022-08-28 | End: 2022-09-26

## 2022-08-28 RX ORDER — POLYETHYLENE GLYCOL 3350 17 G/17G
17 POWDER, FOR SOLUTION ORAL
Qty: 510 | Refills: 0
Start: 2022-08-28 | End: 2022-09-26

## 2022-08-28 RX ORDER — SENNA PLUS 8.6 MG/1
2 TABLET ORAL
Qty: 60 | Refills: 0
Start: 2022-08-28 | End: 2022-09-26

## 2022-08-28 RX ORDER — OXYCODONE HYDROCHLORIDE 5 MG/1
1 TABLET ORAL
Qty: 21 | Refills: 0
Start: 2022-08-28 | End: 2022-09-03

## 2022-08-28 RX ORDER — ACETAMINOPHEN 500 MG
2 TABLET ORAL
Qty: 84 | Refills: 0
Start: 2022-08-28 | End: 2022-09-10

## 2022-08-28 RX ADMIN — GABAPENTIN 300 MILLIGRAM(S): 400 CAPSULE ORAL at 05:27

## 2022-08-28 RX ADMIN — Medication 1000 MILLIGRAM(S): at 13:51

## 2022-08-28 RX ADMIN — POLYETHYLENE GLYCOL 3350 17 GRAM(S): 17 POWDER, FOR SOLUTION ORAL at 13:54

## 2022-08-28 RX ADMIN — PANTOPRAZOLE SODIUM 40 MILLIGRAM(S): 20 TABLET, DELAYED RELEASE ORAL at 05:27

## 2022-08-28 RX ADMIN — Medication 1000 MILLIGRAM(S): at 14:09

## 2022-08-28 RX ADMIN — Medication 1000 MILLIGRAM(S): at 05:27

## 2022-08-28 RX ADMIN — GABAPENTIN 300 MILLIGRAM(S): 400 CAPSULE ORAL at 13:51

## 2022-08-28 RX ADMIN — Medication 1000 MILLIGRAM(S): at 06:13

## 2022-08-28 RX ADMIN — LOSARTAN POTASSIUM 25 MILLIGRAM(S): 100 TABLET, FILM COATED ORAL at 05:27

## 2022-08-28 NOTE — CHART NOTE - NSCHARTNOTEFT_GEN_A_CORE
Patient s/p Bilateral Plantar fasciotomy with severe foot pain and unable to fully bear weight on feet would benefit form a wheelchair temporarily for a few weeks to assist with her ADLs and recovery. Please approve a wheelchair.

## 2022-08-28 NOTE — DISCHARGE NOTE PROVIDER - NSDCMRMEDTOKEN_GEN_ALL_CORE_FT
amitriptyline 25 mg oral tablet: 1 tab(s) orally once a day (at bedtime)  amLODIPine 5 mg oral tablet: 1 tab(s) orally once a day (at bedtime)  atorvastatin 40 mg oral tablet: 1 tab(s) orally once a day (at bedtime)  losartan 25 mg oral tablet: 1 tab(s) orally once a day (at bedtime)  metFORMIN 500 mg oral tablet: 1 tab(s) orally 2 times a day  omeprazole 20 mg oral delayed release capsule: 1 cap(s) orally once a day   acetaminophen 500 mg oral tablet: 2 tab(s) orally every 8 hours, As Needed for moderate pain  amitriptyline 25 mg oral tablet: 1 tab(s) orally once a day (at bedtime)  amLODIPine 5 mg oral tablet: 1 tab(s) orally once a day (at bedtime)  atorvastatin 40 mg oral tablet: 1 tab(s) orally once a day (at bedtime)  gabapentin 300 mg oral capsule: 1 cap(s) orally every 8 hours MDD:3 tabs  losartan 25 mg oral tablet: 1 tab(s) orally once a day (at bedtime)  metFORMIN 500 mg oral tablet: 1 tab(s) orally 2 times a day  omeprazole 20 mg oral delayed release capsule: 1 cap(s) orally once a day  oxyCODONE 5 mg oral capsule: 1 cap(s) orally every 8 hours, As Needed for severe pain MDD:3 tabs  polyethylene glycol 3350 oral powder for reconstitution: 17 gram(s) orally once a day as needed for  constipation  senna leaf extract oral tablet: 2 tab(s) orally once a day (at bedtime)

## 2022-08-28 NOTE — PROGRESS NOTE ADULT - REASON FOR ADMISSION
b/l plantar fasciotomy
b/l plantar fasciotomy
severe bilateral foot pain s/p b/l plantar fasciotomy
b/l plantar fasciotomy
b/l plantar fasciotomy

## 2022-08-28 NOTE — DISCHARGE NOTE NURSING/CASE MANAGEMENT/SOCIAL WORK - PATIENT PORTAL LINK FT
You can access the FollowMyHealth Patient Portal offered by [facility] by registering at the following website: [email or web address]. By joining [facility]’s FollowMyHealth portal, you will also be able to view your health information using other applications (apps) compatible with our system.

## 2022-08-28 NOTE — DISCHARGE NOTE PROVIDER - HOSPITAL COURSE
This is a 47 yo F with PMHx of HTN, HLD, fatty liver disease, GERD, Migraines, arthritis, chronic constipation, and anemia who presents s/p b/l plantar fasciotomy. Admitted for pain management  Seen and examined pt at bedside, c/o severe pain, medications help only short period of time, unable to ambulate due to pain. Pain management and podiatry *-*-*-*-*- INCOMPLETE This is a 45 yo F with PMHx of HTN, HLD, fatty liver disease, GERD, Migraines, arthritis, chronic constipation, and anemia who presents foot pain after B/L plantar fasciotomy on 8/25. Admitted to medicine for pain management, podiatry and pain management followed. Now with marked improvement, PT reccs HPT.  Will arrange for wheelchair to be delivered to patient home   Given clinical course decision made to discharge patient home with outpatient follow up  Discharge discussed with attending   This is only a brief summary of patient's hospital stay, for full course please see EMR

## 2022-08-28 NOTE — DISCHARGE NOTE PROVIDER - NSDCCPCAREPLAN_GEN_ALL_CORE_FT
PRINCIPAL DISCHARGE DIAGNOSIS  Diagnosis: Acute postoperative pain of foot  Assessment and Plan of Treatment:       SECONDARY DISCHARGE DIAGNOSES  Diagnosis: HTN (hypertension)  Assessment and Plan of Treatment:     Diagnosis: Constipation  Assessment and Plan of Treatment:     Diagnosis: HLD (hyperlipidemia)  Assessment and Plan of Treatment:     Diagnosis: Migraines  Assessment and Plan of Treatment:      PRINCIPAL DISCHARGE DIAGNOSIS  Diagnosis: Acute postoperative pain of foot  Assessment and Plan of Treatment: You had a plantar fasciotomy on 8/25, you presented to the hospital with post operative pain. You were followed by the pain management team. You were started on a pain regimen and now your pain is controlled. Take your prescribed medication as ordered, follow up with the podiatry clinic as directed.      SECONDARY DISCHARGE DIAGNOSES  Diagnosis: HTN (hypertension)  Assessment and Plan of Treatment: You have a history of high blood pressure, it is important to continue a Low salt diet, Activity as tolerated. Take all medication as prescribed.  Follow up with your medical doctor for routine blood pressure monitoring at your next visit. Notify your doctor if you have any of the following symptoms:  Dizziness, Lightheadedness, Blurry vision, Headache, Chest pain, Shortness of breath      Diagnosis: Constipation  Assessment and Plan of Treatment: Constipation means you have hard, dry bowel movements, or you go longer than usual between bowel movements. Drink liquids as directed.   Eat high-fiber foods. This may help decrease constipation by adding bulk to your bowel movements. High-fiber foods include fruit, vegetables, whole-grain breads and cereals, and beans. Regular physical activity can help stimulate your intestines. You were started on a stool softener, you may take it daily as needed for  consitpation    Diagnosis: HLD (hyperlipidemia)  Assessment and Plan of Treatment: You have a history of hyperlipidemia, continue taking your atorvastatin as directed. Follow up with your primary care doctor to have your blood levels monitored. Maintain a low fat diet, and excersize once cleared by your surgical team       PRINCIPAL DISCHARGE DIAGNOSIS  Diagnosis: Acute postoperative pain of foot  Assessment and Plan of Treatment: You had a plantar fasciotomy on 8/25/22 after which you was discharged home. You presented to the hospital with intractable post operative pain. You was treated with intravenous pain medications Toradol and Morphine with improvement. Gabapentin was invreased over 2 days to three times a day with much better pain control. You were followed by the pain management team. You were started on a pain regimen and now your pain is controlled. Take your prescribed medication as ordered, follow up with the podiatry clinic as directed.  TAKE TYLENOL 1GM THREE A DAY AS NEEDED FOR MILD TO MODERATE PAIN AND USE OXYCODONE THREE TIMES A DAY AS NEEDED ONLY FOR SEVERE PAIN. PLEASE NOTE THAT PAIN MEDICATIONS MAY MAKE YOU CONSTIPATED.   FOLLOW UP WITH YOUR PODIATRIAT AS OUTPATIENT AS SCHEDULED      SECONDARY DISCHARGE DIAGNOSES  Diagnosis: Constipation  Assessment and Plan of Treatment: You are at high risk of getting constipated due tyo pain medications and also given that you are not walking postoperatively.   You have been placed on stool softener Senna to be taken every night and take Miralax once daily if you do not have a good BM.   Drink liquids as directed.   Eat high-fiber foods. This may help decrease constipation by adding bulk to your bowel movements. High-fiber foods include fruit, vegetables, whole-grain breads and cereals, and beans. Regular physical activity can help stimulate your intestines. You were started on a stool softener, you may take it daily as needed for  consitpation    Diagnosis: HTN (hypertension)  Assessment and Plan of Treatment: You have a history of high blood pressure, it is important to continue a Low salt diet, Activity as tolerated. Continue with Amlodipine and Losartan as before.   Follow up with your medical doctor for routine blood pressure monitoring at your next visit. Notify your doctor if you have any of the following symptoms:  Dizziness, Lightheadedness, Blurry vision, Headache, Chest pain, Shortness of breath      Diagnosis: HLD (hyperlipidemia)  Assessment and Plan of Treatment: You have a history of hyperlipidemia, continue taking your atorvastatin as directed. Follow up with your primary care doctor to have your blood levels monitored. Maintain a low fat diet, and excersize once cleared by your surgical team      Diagnosis: Prediabetes  Assessment and Plan of Treatment: You reported having history of Prediabetes. But you reported being on metformin which indicated you likely have diabetes. YOUR SUGARS ARE TIGHTLY ONTROLED DURING HOSPITALIZATION WITHOUT ANY METFORMIN. WILL ADVISE TO HOLD METFORMIN AND RESUME IF YOU NOTICE SUGARS PERSISTENLTY ABOVE 140. FOLOW UP WITH YOUR PCP FOR AN A1C; GOAL A1C LES THAN 6.5 TO 7.0;     PRINCIPAL DISCHARGE DIAGNOSIS  Diagnosis: Acute postoperative pain of foot  Assessment and Plan of Treatment: You had a plantar fasciotomy on 8/25/22 after which you was discharged home. You presented to the hospital with intractable post operative pain. You was treated with intravenous pain medications Toradol and Morphine with improvement. Gabapentin was invreased over 2 days to three times a day with much better pain control. You were followed by the pain management team. You were started on a pain regimen and now your pain is controlled. Take your prescribed medication as ordered, follow up with the podiatry clinic as directed.  TAKE TYLENOL 1GM THREE A DAY AS NEEDED FOR MILD TO MODERATE PAIN AND USE OXYCODONE THREE TIMES A DAY AS NEEDED ONLY FOR SEVERE PAIN. PLEASE NOTE THAT PAIN MEDICATIONS MAY MAKE YOU CONSTIPATED.   FOLLOW UP WITH YOUR PODIATRIAT AS OUTPATIENT AS SCHEDULED  We have requested Case mgmt to arrange Home Physical therapy as suggested by PT evaluation in the hospital. We also put a request for Wheelchair as you requested and if approved will be delivered to your house. Please follow up with  [phone] if any questions.      SECONDARY DISCHARGE DIAGNOSES  Diagnosis: Constipation  Assessment and Plan of Treatment: You are at high risk of getting constipated due tyo pain medications and also given that you are not walking postoperatively.   You have been placed on stool softener Senna to be taken every night and take Miralax once daily if you do not have a good BM.   Drink liquids as directed.   Eat high-fiber foods. This may help decrease constipation by adding bulk to your bowel movements. High-fiber foods include fruit, vegetables, whole-grain breads and cereals, and beans. Regular physical activity can help stimulate your intestines. You were started on a stool softener, you may take it daily as needed for  consitpation    Diagnosis: HTN (hypertension)  Assessment and Plan of Treatment: You have a history of high blood pressure, it is important to continue a Low salt diet, Activity as tolerated. Continue with Amlodipine and Losartan as before.   Follow up with your medical doctor for routine blood pressure monitoring at your next visit. Notify your doctor if you have any of the following symptoms:  Dizziness, Lightheadedness, Blurry vision, Headache, Chest pain, Shortness of breath      Diagnosis: HLD (hyperlipidemia)  Assessment and Plan of Treatment: You have a history of hyperlipidemia, continue taking your atorvastatin as directed. Follow up with your primary care doctor to have your blood levels monitored. Maintain a low fat diet, and excersize once cleared by your surgical team      Diagnosis: Prediabetes  Assessment and Plan of Treatment: You reported having history of Prediabetes. But you reported being on metformin which indicated you likely have diabetes. YOUR SUGARS ARE TIGHTLY ONTROLED DURING HOSPITALIZATION WITHOUT ANY METFORMIN. WILL ADVISE TO HOLD METFORMIN AND RESUME IF YOU NOTICE SUGARS PERSISTENLTY ABOVE 140. FOLOW UP WITH YOUR PCP FOR AN A1C; GOAL A1C LES THAN 6.5 TO 7.0;

## 2022-08-28 NOTE — DISCHARGE NOTE PROVIDER - CARE PROVIDERS DIRECT ADDRESSES
,DirectAddress_Unknown ,DirectAddress_Unknown,[email or web address]

## 2022-08-28 NOTE — DISCHARGE NOTE PROVIDER - CARE PROVIDER_API CALL
Arie Alfonso  INTERNAL MEDICINE  [address] [place] [address]  Phone: [phone]  Fax: ()-  Follow Up Time: 1 week   Arie Alfonso  INTERNAL MEDICINE  [address] [place] [address]  Phone: [phone]  Fax: ()-  Follow Up Time: 1 week    Scar Higgins (DPM)  Foot Surgery; [facility]ear[facility]kle [facility]  [address] [place] [address]  Phone: [phone]  Fax: [phone]  Follow Up Time: 1 week

## 2022-08-28 NOTE — DISCHARGE NOTE PROVIDER - PROVIDER TOKENS
PROVIDER:[TOKEN:[[id number]],FOLLOWUP:[1 week]] PROVIDER:[TOKEN:[[id number]],FOLLOWUP:[1 week]],PROVIDER:[TOKEN:[[id number]],FOLLOWUP:[1 week]]

## 2022-08-28 NOTE — PROGRESS NOTE ADULT - SUBJECTIVE AND OBJECTIVE BOX
Podiatry interval: Pt was seen bedside A&Ox3 in no acute distress. Reports some pain to both feet, relieved by Motrin. Patient inquires whether she would be able to fly to [place] on 9/6/22. Denies any acute trauma. Denies constitutional symptoms. Denies any other pedal complaints.     Podiatry HPI: Patient is a 46y old  Female who presents with a chief complaint of b/l plantar fasciotomy (26 Aug 2022 09:03) had a procedure done 8/25/22 and complains of b/l pain at surgical site, 9/10 in intensity. Pt denies chills, SOB, CP, headache, N/V/D, dysuria, or numbness.    HPI:  This is a 45 yo F with PMHx of HTN, HLD, fatty liver disease, GERD, Migraines, arthritis, chronic constipation, and anemia who presents s/p b/l plantar fasciotomy. Pt states for a year she had increasing pain while walking which she received cortisone injections for, which did not help. She had a bilateral plantar fasciotomy done 8/25/22. P    Patient admits to  (-) Fevers, (-) Chills, (-) Nausea, (-) Vomiting, (-) Shortness of Breath (-) calf pain (-) chest pain     Medications acetaminophen     Tablet .. 1000 milliGRAM(s) Oral every 8 hours  aluminum hydroxide/magnesium hydroxide/simethicone Suspension 30 milliLiter(s) Oral every 4 hours PRN  amitriptyline 25 milliGRAM(s) Oral at bedtime  amLODIPine   Tablet 5 milliGRAM(s) Oral at bedtime  atorvastatin 40 milliGRAM(s) Oral at bedtime  dextrose 50% Injectable 25 Gram(s) IV Push once  gabapentin 300 milliGRAM(s) Oral every 8 hours  glucagon  Injectable 1 milliGRAM(s) IntraMuscular once  insulin lispro (ADMELOG) corrective regimen sliding scale   SubCutaneous Before meals and at bedtime  losartan 25 milliGRAM(s) Oral daily  melatonin 3 milliGRAM(s) Oral at bedtime PRN  ondansetron Injectable 4 milliGRAM(s) IV Push every 8 hours PRN  pantoprazole    Tablet 40 milliGRAM(s) Oral before breakfast  polyethylene glycol 3350 17 Gram(s) Oral daily  senna 2 Tablet(s) Oral at bedtime    FHFamily history of diabetes mellitus (DM) (Sibling)    FH: lung cancer    ,   PMHHTN (hypertension), benign    HLD (hyperlipidemia)    Migraine    Plantar fascial fibromatosis    Obese       PSHNo significant past surgical history    H/O varicose vein stripping        Labs       08-27    139  |  102  |  6<L>  ----------------------------<  122<H>  3.7   |  28  |  0.59    Ca    9.1      27 Aug 2022 06:00       Vital Signs Last 24 Hrs  T(C): 36.6 (28 Aug 2022 06:16), Max: 36.6 (28 Aug 2022 06:16)  T(F): 97.8 (28 Aug 2022 06:16), Max: 97.8 (28 Aug 2022 06:16)  HR: 64 (28 Aug 2022 06:16) (64 - 89)  BP: 156/72 (28 Aug 2022 06:16) (134/69 - 156/72)  BP(mean): --  RR: 18 (28 Aug 2022 06:16) (17 - 18)  SpO2: 96% (28 Aug 2022 06:16) (96% - 97%)    Parameters below as of 28 Aug 2022 06:16  Patient On (Oxygen Delivery Method): room air       ROS unremarkable outside of HPI    PHYSICAL EXAM  LE Focused:    Vasc:  DP and PT palpable 2/4 b/l. No edema. Pedal Hair present. CFT < 3 secs x 10. TG: warm to warm   Derm: B/L stab incisions to medial and lateral calcaneus, sutures intact with skin well coapted. No drainage, no edema, no malodor. No clinical signs of infection   Neuro: protective sensation intact b/l   MSK: Deferred due to pain, cannot tolerate      IMAGING: ?xray    CULTURES:     A:  S/P b/l EPF procedure    P:   Patient evaluated and Chart reviewed   Discussed diagnosis and treatment with patient  Patient is being discharged today  Patient to follow-up at Dr. Higgins's office next week  Continue with IV pain meds per pain mgmt  Continue w/ PT recommendations   Pt: Weight bearing as tolerated as pain resolves   Podiatry to follow while in house.  Discussed with Attending Dr Higgins

## 2022-08-28 NOTE — DISCHARGE NOTE PROVIDER - ATTENDING DISCHARGE PHYSICAL EXAMINATION:
Psych: AAO x3  Neuro: No gross focal deficits; Power and sensation intact  CVS: S1S2 present, regular, no edema  Resp: BLAE+, No wheeze or Rhonchi  GI: Soft, BS+, Non tender, non distended  Extr: No  calf tenderness B/L Lower extremities  Skin: Warm and moist without any rashes   Psych: AAO x3  Neuro: No gross focal deficits; Power and sensation intact  CVS: S1S2 present, regular, no edema  Resp: BLAE+, No wheeze or Rhonchi  GI: Soft, BS+, Non tender, non distended  Extr: No  calf tenderness B/L Lower extremities; B/L FOOT DRESSING INTACT (DRESSED BY PODIATRY)  Skin: Warm and moist without any rashes

## 2022-08-28 NOTE — DISCHARGE NOTE PROVIDER - NSFOLLOWUPCLINICS_GEN_ALL_ED_FT
[facility]  Podiatry/Wound Care  [address] [place] [address]  Phone: [phone]  Fax: [phone]  Follow Up Time: 1 week

## 2022-08-31 LAB — GLUCOSE BLDC GLUCOMTR-MCNC: 110 MG/DL — HIGH (ref 70–99)

## 2022-09-04 ENCOUNTER — EMERGENCY (EMERGENCY)
Facility: HOSPITAL | Age: 47
LOS: 1 days | Discharge: HOME | End: 2022-09-04
Attending: EMERGENCY MEDICINE
Payer: MEDICAID

## 2022-09-04 VITALS
HEART RATE: 95 BPM | DIASTOLIC BLOOD PRESSURE: 96 MMHG | TEMPERATURE: 98 F | SYSTOLIC BLOOD PRESSURE: 140 MMHG | WEIGHT: 172.84 LBS | RESPIRATION RATE: 18 BRPM | OXYGEN SATURATION: 97 % | HEIGHT: 60 IN

## 2022-09-04 DIAGNOSIS — Z98.890 OTHER SPECIFIED POSTPROCEDURAL STATES: Chronic | ICD-10-CM

## 2022-09-04 PROCEDURE — 99283 EMERGENCY DEPT VISIT LOW MDM: CPT

## 2022-09-04 NOTE — ED ADULT TRIAGE NOTE - CHIEF COMPLAINT QUOTE
She has bleeding/swelling/pain from both feet surgical site x 3 days.  She had bilateral heel surgery on 08/25/2022

## 2022-09-04 NOTE — ED ADULT NURSE NOTE - OBJECTIVE STATEMENT
Patient presented to the ED with c/o bleeding, swelling and pain to b/l heel s/p heel surgery 3 days ago.

## 2022-09-05 PROBLEM — M72.2 PLANTAR FASCIAL FIBROMATOSIS: Chronic | Status: ACTIVE | Noted: 2022-08-10

## 2022-09-05 PROBLEM — E66.9 OBESITY, UNSPECIFIED: Chronic | Status: ACTIVE | Noted: 2022-08-26

## 2022-09-05 NOTE — ED PROVIDER NOTE - OBJECTIVE STATEMENT
PT with status posterior plantar fasciotomy with Dr Rosado (saw doctor for followup 3 days ago everything was fine) with foot pain. PT concerned because she noticed small amount of bleeding from several of the incisions and increased pain to the incision on the left foot and feels the area is mildly swollen. Patient has a flight today at 6 am, going back to [place] where she lives and she wanted to be sure everything is ok. no fever no purulent drainage

## 2022-09-05 NOTE — ED PROVIDER NOTE - CLINICAL SUMMARY MEDICAL DECISION MAKING FREE TEXT BOX
patient with concern over bloody drainage from surgical wound. mild swelling and tenderness noted. no obvious collection, purulence or cellulitis. will give preemptive antibiotics and the left foot medial incision is mildly moist and not as well healed, mild have early infection. will followup with doctor in [place] to remove sutures

## 2022-09-05 NOTE — ED PROVIDER NOTE - PATIENT PORTAL LINK FT
You can access the FollowMyHealth Patient Portal offered by [facility] by registering at the following website: [email or web address]. By joining [employer]’s FollowMyHealth portal, you will also be able to view your health information using other applications (apps) compatible with our system.

## 2022-09-05 NOTE — ED PROVIDER NOTE - PHYSICAL EXAMINATION
2 sutured incisions on each foot lateral plantar aspects. the incision on left medial foot is mildly moist, dry bloody drainage on gauze, mildly tender and swollen area around the wound. no erythema, no foul odor. the incisions on right foot are dry, well healed.

## 2022-09-05 NOTE — ED PROVIDER NOTE - NSFOLLOWUPINSTRUCTIONS_ED_ALL_ED_FT
Sutured Wound Care      Sutures are stitches that can be used to close wounds. Sutures come in different materials. They may break down as your wound heals (absorbable), or they may need to be removed (nonabsorbable). Taking care of your wound properly can help to prevent pain and infection. It can also help your wound heal more quickly. Follow instructions from your health care provider about how to care for your sutured wound.      Supplies needed:    •Soap and water.      •A clean, dry towel.      •Wound cleanser or saline, if needed.      •A clean gauze or bandage (dressing), if needed.      •Antibiotic ointment, if told by your health care provider.        How to care for your sutured wound  Two stitched wounds. One is normal. The other is red with pus and infected.   •Keep the wound completely dry for the first 24 hours, or for as long as told by your health care provider. After 24–48 hours, you may shower or bathe as told by your health care provider. Do not soak or submerge the wound in water until the sutures have been removed.    •After the first 24 hours, clean the wound once a day, or as often as told by your health care provider. Use the following steps:  •Wash and rinse the wound as told by your health care provider.      •Pat the wound dry with a clean towel. Do not rub the wound.        •After cleaning the wound, apply a thin layer of antibiotic ointment as told by your health care provider. This will prevent infection and keep the dressing from sticking to the wound.    •Follow instructions from your health care provider about how to change your dressing. Make sure you:  •Wash your hands with soap and water for at least 20 seconds before and after you change your dressing. If soap and water are not available, use hand .      •Change your dressing at least once a day, or as often as told by your health care provider. If your dressing gets wet or dirty, change it.      •Leave sutures and other skin closures, such as adhesive strips or skin glue, in place. These skin closures may need to stay in place for 2 weeks or longer. If adhesive strip edges start to loosen and curl up, you may trim the loose edges. Do not remove adhesive strips completely unless your health care provider tells you to do that.      •Check your wound every day for signs of infection. Watch for:  •Redness, swelling, or pain.      •Fluid or blood.      •New warmth, a rash, or hardness at the wound site.      •Pus or a bad smell.        •Have the sutures removed as told by your health care provider.        Follow these instructions at home:    Medicines     •Take or apply over-the-counter and prescription medicines only as told by your health care provider.      •If you were prescribed an antibiotic medicine or ointment, take or apply it as told by your health care provider. Do not stop using the antibiotic even if your condition improves.      General instructions     •To help reduce scarring after your wound heals, cover your wound with clothing or apply sunscreen of at least 30 SPF whenever you are outside.      • Do not scratch or pick at your wound.      •Avoid stretching your wound.      •Raise (elevate) the injured area above the level of your heart while you are sitting or lying down, if possible.      •Eat a diet that includes protein, vitamin A, and vitamin C to help the wound heal.      •Drink enough fluid to keep your urine pale yellow.      •Keep all follow-up visits. This is important.        Contact a health care provider if:    •You received a tetanus shot and you have swelling, severe pain, redness, or bleeding at the injection site.      •Your wound breaks open or you notice something coming out if it, such as wood or glass.    •You have any of these signs of infection:  •Redness, swelling, or pain around your wound.      •Fluid or blood coming from your wound.      •New warmth, a rash, or hardness around the wound.      •A fever.        •The skin near your wound changes color.      •You have pain that does not get better with medicine.      •You develop numbness around the wound.        Get help right away if:    •You develop severe swelling or more pain around your wound.      •You have pus or a bad smell coming from your wound.      •You develop painful lumps near your wound or anywhere on your body.      •You have a red streak spreading out from your wound.    •The wound is on your hand or foot and:  •Your fingers or toes look pale or bluish.      •You cannot properly move a finger or toe.      •You have numbness that is spreading down your hand, foot, fingers, or toes.          Summary    •Sutures are stitches that can be used to close wounds.      •Taking care of your wound properly can help to prevent pain and infection.      •Keep the wound completely dry for the first 24 hours, or for as long as told by your health care provider. After 24–48 hours, you may shower or bathe as told by your health care provider.      •To help with healing, eat foods that are rich in protein, vitamin A, and vitamin C.      This information is not intended to replace advice given to you by your health care provider. Make sure you discuss any questions you have with your health care provider.

## 2022-09-21 NOTE — ED PROVIDER NOTE - CHILD ABUSE FACILITY
Hemodynamic equipment used: 5 lead ECG, LIKEPAK Hands Off Patches, LIFEPAK With 3 Leads, Calometric ETCO2 device, Machine BP Cuff and pulse oximeter probe. H

## 2023-02-16 NOTE — ED ADULT NURSE NOTE - CAS DISCH TRANSFER METHOD
Pt presents for evaluation of trouble swallowing since Monday. Has been able to eat and drink, but with difficulty. Hx of tonsillitis with similar presentation. Has taken ibuprofen and tylenol around 1400.       Private car

## 2023-11-19 NOTE — H&P PST ADULT - I HAVE PERSONALLY SEEN AND EXAMINED THE PATIENT. THERE HAVE NOT BEEN ANY CHANGES IN THE PATIENT'S HISTORY OR EXAM UNLESS COMMENTED BELOW
0861 pt 48yf aox4 ambulatory self care/abdominal pain and vaginal bleeding since friday. Given depo shot by OBGYN to stop bleeding, but not working  need blood transfusion on 10/31, states she is going through 12 pads/day
Statement Selected

## 2024-04-21 ENCOUNTER — EMERGENCY (EMERGENCY)
Facility: HOSPITAL | Age: 49
LOS: 1 days | Discharge: HOME | End: 2024-04-21
Attending: EMERGENCY MEDICINE | Admitting: EMERGENCY MEDICINE
Payer: MEDICAID

## 2024-04-21 VITALS
OXYGEN SATURATION: 98 % | TEMPERATURE: 98 F | SYSTOLIC BLOOD PRESSURE: 145 MMHG | HEART RATE: 87 BPM | RESPIRATION RATE: 16 BRPM | DIASTOLIC BLOOD PRESSURE: 94 MMHG

## 2024-04-21 DIAGNOSIS — Z98.890 OTHER SPECIFIED POSTPROCEDURAL STATES: Chronic | ICD-10-CM

## 2024-04-21 LAB
ALBUMIN SERPL ELPH-MCNC: 4.7 G/DL — SIGNIFICANT CHANGE UP (ref 3.3–5)
ALP SERPL-CCNC: 64 U/L — SIGNIFICANT CHANGE UP (ref 40–120)
ALT FLD-CCNC: 19 U/L — SIGNIFICANT CHANGE UP (ref 4–33)
ANION GAP SERPL CALC-SCNC: 15 MMOL/L — HIGH (ref 7–14)
AST SERPL-CCNC: 16 U/L — SIGNIFICANT CHANGE UP (ref 4–32)
BASOPHILS # BLD AUTO: 0.02 K/UL — SIGNIFICANT CHANGE UP (ref 0–0.2)
BASOPHILS NFR BLD AUTO: 0.3 % — SIGNIFICANT CHANGE UP (ref 0–2)
BILIRUB SERPL-MCNC: 0.4 MG/DL — SIGNIFICANT CHANGE UP (ref 0.2–1.2)
BUN SERPL-MCNC: 13 MG/DL — SIGNIFICANT CHANGE UP (ref 7–23)
CALCIUM SERPL-MCNC: 9.7 MG/DL — SIGNIFICANT CHANGE UP (ref 8.4–10.5)
CHLORIDE SERPL-SCNC: 103 MMOL/L — SIGNIFICANT CHANGE UP (ref 98–107)
CO2 SERPL-SCNC: 24 MMOL/L — SIGNIFICANT CHANGE UP (ref 22–31)
CREAT SERPL-MCNC: 0.58 MG/DL — SIGNIFICANT CHANGE UP (ref 0.5–1.3)
EGFR: 110 ML/MIN/1.73M2 — SIGNIFICANT CHANGE UP
EOSINOPHIL # BLD AUTO: 0.06 K/UL — SIGNIFICANT CHANGE UP (ref 0–0.5)
EOSINOPHIL NFR BLD AUTO: 0.9 % — SIGNIFICANT CHANGE UP (ref 0–6)
FLUAV AG NPH QL: SIGNIFICANT CHANGE UP
FLUBV AG NPH QL: SIGNIFICANT CHANGE UP
GLUCOSE SERPL-MCNC: 92 MG/DL — SIGNIFICANT CHANGE UP (ref 70–99)
HCT VFR BLD CALC: 37.4 % — SIGNIFICANT CHANGE UP (ref 34.5–45)
HGB BLD-MCNC: 12.5 G/DL — SIGNIFICANT CHANGE UP (ref 11.5–15.5)
IANC: 3.16 K/UL — SIGNIFICANT CHANGE UP (ref 1.8–7.4)
IMM GRANULOCYTES NFR BLD AUTO: 0.1 % — SIGNIFICANT CHANGE UP (ref 0–0.9)
LIDOCAIN IGE QN: 24 U/L — SIGNIFICANT CHANGE UP (ref 7–60)
LYMPHOCYTES # BLD AUTO: 2.91 K/UL — SIGNIFICANT CHANGE UP (ref 1–3.3)
LYMPHOCYTES # BLD AUTO: 43.4 % — SIGNIFICANT CHANGE UP (ref 13–44)
MCHC RBC-ENTMCNC: 28.1 PG — SIGNIFICANT CHANGE UP (ref 27–34)
MCHC RBC-ENTMCNC: 33.4 GM/DL — SIGNIFICANT CHANGE UP (ref 32–36)
MCV RBC AUTO: 84 FL — SIGNIFICANT CHANGE UP (ref 80–100)
MONOCYTES # BLD AUTO: 0.55 K/UL — SIGNIFICANT CHANGE UP (ref 0–0.9)
MONOCYTES NFR BLD AUTO: 8.2 % — SIGNIFICANT CHANGE UP (ref 2–14)
NEUTROPHILS # BLD AUTO: 3.16 K/UL — SIGNIFICANT CHANGE UP (ref 1.8–7.4)
NEUTROPHILS NFR BLD AUTO: 47.1 % — SIGNIFICANT CHANGE UP (ref 43–77)
NRBC # BLD: 0 /100 WBCS — SIGNIFICANT CHANGE UP (ref 0–0)
NRBC # FLD: 0 K/UL — SIGNIFICANT CHANGE UP (ref 0–0)
PLATELET # BLD AUTO: 287 K/UL — SIGNIFICANT CHANGE UP (ref 150–400)
POTASSIUM SERPL-MCNC: 3.3 MMOL/L — LOW (ref 3.5–5.3)
POTASSIUM SERPL-SCNC: 3.3 MMOL/L — LOW (ref 3.5–5.3)
PROT SERPL-MCNC: 7.9 G/DL — SIGNIFICANT CHANGE UP (ref 6–8.3)
RBC # BLD: 4.45 M/UL — SIGNIFICANT CHANGE UP (ref 3.8–5.2)
RBC # FLD: 13 % — SIGNIFICANT CHANGE UP (ref 10.3–14.5)
RSV RNA NPH QL NAA+NON-PROBE: SIGNIFICANT CHANGE UP
SARS-COV-2 RNA SPEC QL NAA+PROBE: SIGNIFICANT CHANGE UP
SODIUM SERPL-SCNC: 142 MMOL/L — SIGNIFICANT CHANGE UP (ref 135–145)
WBC # BLD: 6.71 K/UL — SIGNIFICANT CHANGE UP (ref 3.8–10.5)
WBC # FLD AUTO: 6.71 K/UL — SIGNIFICANT CHANGE UP (ref 3.8–10.5)

## 2024-04-21 PROCEDURE — 70498 CT ANGIOGRAPHY NECK: CPT | Mod: 26,MC

## 2024-04-21 PROCEDURE — 93010 ELECTROCARDIOGRAM REPORT: CPT

## 2024-04-21 PROCEDURE — 99285 EMERGENCY DEPT VISIT HI MDM: CPT

## 2024-04-21 PROCEDURE — 70496 CT ANGIOGRAPHY HEAD: CPT | Mod: 26,MC

## 2024-04-21 RX ORDER — METOCLOPRAMIDE HCL 10 MG
10 TABLET ORAL ONCE
Refills: 0 | Status: COMPLETED | OUTPATIENT
Start: 2024-04-21 | End: 2024-04-21

## 2024-04-21 RX ORDER — SODIUM CHLORIDE 9 MG/ML
1000 INJECTION INTRAMUSCULAR; INTRAVENOUS; SUBCUTANEOUS ONCE
Refills: 0 | Status: COMPLETED | OUTPATIENT
Start: 2024-04-21 | End: 2024-04-21

## 2024-04-21 RX ORDER — POTASSIUM CHLORIDE 20 MEQ
40 PACKET (EA) ORAL ONCE
Refills: 0 | Status: COMPLETED | OUTPATIENT
Start: 2024-04-21 | End: 2024-04-21

## 2024-04-21 RX ORDER — FAMOTIDINE 10 MG/ML
20 INJECTION INTRAVENOUS ONCE
Refills: 0 | Status: COMPLETED | OUTPATIENT
Start: 2024-04-21 | End: 2024-04-21

## 2024-04-21 RX ORDER — ACETAMINOPHEN 500 MG
1000 TABLET ORAL ONCE
Refills: 0 | Status: COMPLETED | OUTPATIENT
Start: 2024-04-21 | End: 2024-04-21

## 2024-04-21 RX ADMIN — Medication 40 MILLIEQUIVALENT(S): at 22:47

## 2024-04-21 RX ADMIN — Medication 30 MILLILITER(S): at 20:58

## 2024-04-21 RX ADMIN — Medication 400 MILLIGRAM(S): at 21:06

## 2024-04-21 RX ADMIN — Medication 10 MILLIGRAM(S): at 20:58

## 2024-04-21 RX ADMIN — SODIUM CHLORIDE 2000 MILLILITER(S): 9 INJECTION INTRAMUSCULAR; INTRAVENOUS; SUBCUTANEOUS at 20:59

## 2024-04-21 RX ADMIN — FAMOTIDINE 20 MILLIGRAM(S): 10 INJECTION INTRAVENOUS at 20:59

## 2024-04-21 NOTE — ED PROVIDER NOTE - PHYSICAL EXAMINATION
GEN:  Non-toxic appearing, non-distressed, speaking full sentences, non-diaphoretic, AAOx3  HEENT:  NCAT, neck supple, EOMI, PERRLA, sclera anicteric, no conjunctival pallor or injection, no stridor, normal voice, no tonsillar exudate, uvula midline  CV:  regular rhythm and rate, s1/s2 audible, no murmurs, rubs or gallops, peripheral pulses 2+ and symmetric  PULM:  non-labored respirations, lungs clear to auscultation bilaterally, no wheezes, crackles or rales  ABD:  non distended, non-tender, no rebound, no guarding, negative Willingham's sign, bowel sounds normal, no cvat  MSK:  no gross deformity, non-tender extremities and joints, range of motion grossly normal appearing, no extremity edema, extremities warm and well perfused   NEURO:  AAOx3, CN II-XII intact, motor 5/5 in upper and lower extremities bilaterally, sensation grossly intact in extremities and trunk, finger to nose testing wnl, no nystagmus, negative Romberg, no pronator drift, no gait deficit  SKIN:  warm, dry, no rash or vesicles

## 2024-04-21 NOTE — ED PROVIDER NOTE - PATIENT PORTAL LINK FT
You can access the FollowMyHealth Patient Portal offered by [facility] by registering at the following website: [email or web address]. By joining [employer]’s FollowMyHealth portal, you will also be able to view your health information using other applications (apps) compatible with our system.

## 2024-04-21 NOTE — ED ADULT NURSE NOTE - OBJECTIVE STATEMENT
patient aaox4. ambulatory. primarily Thai speaking. brother at bedside for translation. recently traveled from [place] this am. endorses headache and dizziness. unable to tolerate po. established iv placed earlier. #20 g to right ac. labs drawn and sent. medicated as ordered. plan of care continues.

## 2024-04-21 NOTE — ED ADULT TRIAGE NOTE - CHIEF COMPLAINT QUOTE
c/o headache and dizziness X 2 months. endorsing tingling to left foot also for two months. past medical history migraine, HTN, HLD

## 2024-04-21 NOTE — ED PROVIDER NOTE - NSFOLLOWUPINSTRUCTIONS_ED_ALL_ED_FT
You were found to have a 4 mm vertebral artery aneurysm on your CT scan.  He was seen by a neurosurgery physicians assistant discussed case with the neurosurgeon who recommended outpatient follow-up.  Is important you return to care for any worsening symptoms including headache, dizziness, nausea, vomiting, numbness, tingling, weakness, visual changes, speech change.    General Headache    WHAT YOU NEED TO KNOW:  Headache pain may be mild or severe. Common causes include stress, medicines, and head injuries. Sleep problems, allergies, and hormone changes can also cause a headache. You may have frequent headaches that have no clear cause. Pain may start in another part of your body and move to your head. Headache pain can also move to other parts of your body. A headache can cause other symptoms, such as nausea and vomiting. A severe headache may be a sign of a stroke or other serious problem that needs immediate treatment.  DISCHARGE INSTRUCTIONS:  Call 911 for any of the following:   •You have any of the following signs of a stroke: ?Numbness or drooping on one side of your face   ?Weakness in an arm or leg  ?Confusion or difficulty speaking  ?Dizziness, a severe headache, or vision loss    Return to the emergency department if:   •You have a headache with neck stiffness and a fever.  •You have a constant headache and are vomiting.  •You have severe pain that does not get better after you take pain medicine.  •You have a headache and the pain worsens when you look into light.  •You have a headache and vision changes, such as blurred vision.  •You have a headache and are forgetful or confused.  Contact your healthcare provider if:   •You have a headache each day that does not get better, even after treatment.  •You have changes in your headaches, or new symptoms that occur when you have a headache.  •Others you live or work with also have headaches.  •You have questions or concerns about your condition or care.  Medicines: You may need any of the following:   •Medicines may be given to prevent or treat headache pain. Do not wait until the pain is severe to take your medicine. Ask your healthcare provider how to take the medicine safely.   •NSAIDs, such as ibuprofen, help decrease swelling, pain, and fever. This medicine is available with or without a doctor's order. NSAIDs can cause stomach bleeding or kidney problems in certain people. If you take blood thinner medicine, always ask if NSAIDs are safe for you. Always read the medicine label and follow directions. Do not give these medicines to children under 6 months of age without direction from your child's healthcare provider.  •Acetaminophen decreases pain and fever. It is available without a doctor's order. Ask how much to take and how often to take it. Follow directions. Read the labels of all other medicines you are using to see if they also contain acetaminophen, or ask your doctor or pharmacist. Acetaminophen can cause liver damage if not taken correctly. Do not use more than 4 grams (4,000 milligrams) total of acetaminophen in one day.   •Antinausea medicine may be given to calm your stomach and help prevent vomiting.  •Take your medicine as directed. Contact your healthcare provider if you think your medicine is not helping or if you have side effects. Tell him of her if you are allergic to any medicine. Keep a list of the medicines, vitamins, and herbs you take. Include the amounts, and when and why you take them. Bring the list or the pill bottles to follow-up visits. Carry your medicine list with you in case of an emergency.  Manage your symptoms:   •Rest in a dark and quiet room. This may help decrease your pain.  •Apply heat or ice as directed. Heat or ice may help decrease pain or muscle spasms. Apply heat or ice on the area for 20 minutes every 2 hours for as many days as directed. Your healthcare provider may recommend that you alternate heat and ice.  •Relax your muscles to help relieve a headache. Lie down in a comfortable position and close your eyes. Relax your muscles slowly. Start at your toes and work your way up your body. A massage or warm bath may also help relax your muscles.  Keep a headache record: Record the dates and times that you get headaches, and what you were doing before the headache started. Also record what you ate and drank in the 24 hours before the headache started. This might help your healthcare provider find the cause of your headaches and make a treatment plan. The record can also help you avoid headache triggers or manage your symptoms.  Get enough sleep: You should get 8 to 10 hours of sleep each night. Create a sleep schedule. Go to bed and wake up at the same times each day. It may be helpful to do something relaxing before bed. Do not watch television right before bed.  Do not smoke: Nicotine and other chemicals in cigarettes and cigars can trigger a headache or make it worse. Ask your healthcare provider for information if you currently smoke and need help to quit. E-cigarettes or smokeless tobacco still contain nicotine. Talk to your healthcare provider before you use these products.   Drink liquids as directed: You may need to drink more liquid to prevent dehydration. Dehydration can cause a headache. Ask your healthcare provider how much liquid to drink each day and which liquids are best for you.   Limit caffeine and alcohol as directed: Your headaches may be triggered by caffeine or alcohol. You may also develop a headache if you drink caffeine regularly and suddenly stop.  Eat a variety of healthy foods: Do not skip meals. Too little food can trigger a headache. Include fruits, vegetables, whole-grain breads, low-fat dairy products, beans, lean meat, and fish. Do not have trigger foods, such as chocolate and red wine. Foods that contain gluten, nitrates, MSG, or artificial sweeteners may also trigger a headache.  Follow up with your healthcare provider as directed: Write down your questions so you remember to ask them during your visits.

## 2024-04-21 NOTE — ED PROVIDER NOTE - OBJECTIVE STATEMENT
48-year-old female past medical history of hypertension, hyperlipidemia, prediabetes, migraine presents for multiple complaints.  Patient states that she has been experiencing head "coldness", and dizziness for 2 months.  Patient states that head symptoms are global, intermittent, no aggravating or alleviating factors.  Dizziness is described as room spinning, intermittent, worse with head movement, associated with nausea and vomiting.  Patient also reports numbness of the left great toe for 2 months.  She also describes pain in epigastric area with eating for the last 2 months which is associated with nonbloody diarrhea.  Patient states for headache and dizziness, she is followed with an ENT who in November 2023 performed MRI and CAT scan of her brain with no significant findings according to the patient.  Patient has been managed with multiple medications including cyclizine, which has been effective at times.  The patient traveled from [place] today and upon arrival to her daughter's home, felt head coldness, dizziness, nausea, and had episodes of vomiting.  She was sent to the ED at instruction of her daughter.  Patient denies tobacco use, alcohol, or illicit drug use.  Denies fever, chills, visual changes, speech changes, new numbness, weakness, tingling, balance problems, hearing changes, chest pain, shortness of breath.

## 2024-04-21 NOTE — ED ADULT NURSE NOTE - NSFALLUNIVINTERV_ED_ALL_ED
Bed/Stretcher in lowest position, wheels locked, appropriate side rails in place/Call bell, personal items and telephone in reach/Instruct patient to call for assistance before getting out of bed/chair/stretcher/Non-slip footwear applied when patient is off stretcher/[place] to call system/Physically safe environment - no spills, clutter or unnecessary equipment/Purposeful proactive rounding/Room/bathroom lighting operational, light cord in reach

## 2024-04-21 NOTE — ED PROVIDER NOTE - CLINICAL SUMMARY MEDICAL DECISION MAKING FREE TEXT BOX
48-year-old female past medical history of hypertension, hyperlipidemia, prediabetes, migraine presents for multiple complaints including headache, dizziness, nausea, vomiting, epigastric pain, left toe numbness.  Onset of symptoms approximately 2 months ago, episodic.  Exam nontoxic-appearing, abdomen soft and nontender, no focal neurodeficits.  Given described symptoms and ENT workup, there is consideration for vertigo.  Viral syndrome is also considered.  Given chronicity of symptoms, intermittent nature, nonfocal neurologic exam likely peripheral etiology of vertigo.  Low concern for acute CVA.  NIH stroke scale is 0.  Will send labs, CTA head given vascular risk factors, supportive care.  Disposition pending.

## 2024-04-21 NOTE — ED PROVIDER NOTE - PROGRESS NOTE DETAILS
CEDENO:  Labs with hypokalemia, repleted.  Troponin negative.  CTA showing 4 mm vertebral artery aneurysm.  Neurosurgery consulted and saw patient.  Recommending outpatient follow-up without admission.  Patient reassessed and feels improved regarding headache, nausea, abdominal pain.  States she is still feeling head "coldness".  Will provide follow-up information for neurology and neurosurgery clinic.  Return precautions given.

## 2024-04-21 NOTE — ED PROVIDER NOTE - NSICDXPASTMEDICALHX_GEN_ALL_CORE_FT
PAST MEDICAL HISTORY:  HLD (hyperlipidemia)     HTN (hypertension), benign     Migraine     Obese     Plantar fascial fibromatosis

## 2024-04-21 NOTE — ED PROVIDER NOTE - CARE PROVIDER_API CALL
Dieudonne Lilly  Neurosurgery  [address], [address]  [place] [address]  Phone: [phone]  Fax: [phone]  Follow Up Time:

## 2024-04-22 VITALS
TEMPERATURE: 98 F | RESPIRATION RATE: 16 BRPM | DIASTOLIC BLOOD PRESSURE: 88 MMHG | SYSTOLIC BLOOD PRESSURE: 128 MMHG | HEART RATE: 95 BPM | OXYGEN SATURATION: 98 %

## 2024-04-22 DIAGNOSIS — I72.6 ANEURYSM OF VERTEBRAL ARTERY: ICD-10-CM

## 2024-04-22 LAB — TROPONIN T, HIGH SENSITIVITY RESULT: <6 NG/L — SIGNIFICANT CHANGE UP

## 2024-04-22 RX ORDER — ESOMEPRAZOLE MAGNESIUM 40 MG/1
1 CAPSULE, DELAYED RELEASE ORAL
Qty: 14 | Refills: 0
Start: 2024-04-22 | End: 2024-05-05

## 2024-04-22 RX ADMIN — Medication 30 MILLILITER(S): at 02:20

## 2024-04-22 NOTE — CONSULT NOTE ADULT - ASSESSMENT
47yo F PMH HTN, HLD, prediabetes and migraines presenting to ED w/ multitude of complaints- migraines, vertigo, toe pain, and abdominal pain. CTH neg. CTA showing 4mm left vertebral artery aneurysm at C3-4 level. Pt neurologically intact.

## 2024-04-22 NOTE — CONSULT NOTE ADULT - SUBJECTIVE AND OBJECTIVE BOX
NEUROSURGERY CONSULT    HPI:  48-year-old female past medical history of hypertension, hyperlipidemia, prediabetes, migraine presents for multiple complaints.  Patient states that she has been experiencing head "coldness", and dizziness for 2 months.  Patient states that head symptoms are global, intermittent, no aggravating or alleviating factors.  Dizziness is described as room spinning, intermittent, worse with head movement, associated with nausea and vomiting.  Patient also reports numbness of the left great toe for 2 months.  She also describes pain in epigastric area with eating for the last 2 months which is associated with nonbloody diarrhea.  Patient states for headache and dizziness, she is followed with an ENT who in November 2023 performed MRI and CAT scan of her brain with no significant findings according to the patient.  Patient has been managed with multiple medications including cyclizine, which has been effective at times.  The patient traveled from [place] today and upon arrival to her daughter's home, felt head coldness, dizziness, nausea, and had episodes of vomiting.  She was sent to the ED at instruction of her daughter.  Patient denies tobacco use, alcohol, or illicit drug use.  Denies fever, chills, visual changes, speech changes, new numbness, weakness, tingling, balance problems, hearing changes, chest pain, shortness of breath.    RADIOLOGY:   < from: CT Angio Neck w/ IV Cont (04.21.24 @ 20:43) >    IMPRESSION:    CT HEAD:  No acute intracranial hemorrhage or mass effect.    CTA HEAD:  Patent intracranial circulation without flow limiting stenosis.  No evidence of aneurysm. Tiny aneurysms can be beyond the resolution of   CTA technique.    CTA NECK:  No evidence of significant stenosis or occlusion.  A 4 mm aneurysm is seen arising from the left vertebral artery at the   C3-C4 level.    Indeterminant 1.2 cm right thyroid nodule. This can be further assessed   with nonemergent thyroid ultrasound.        --- End of Report ---    < end of copied text >      MEDS:      Vital Signs Last 24 Hrs  T(C): 36.7 (21 Apr 2024 16:58), Max: 36.7 (21 Apr 2024 16:58)  T(F): 98 (21 Apr 2024 16:58), Max: 98 (21 Apr 2024 16:58)  HR: 87 (21 Apr 2024 16:58) (87 - 87)  BP: 145/94 (21 Apr 2024 16:58) (145/94 - 145/94)  BP(mean): --  RR: 16 (21 Apr 2024 16:58) (16 - 16)  SpO2: 98% (21 Apr 2024 16:58) (98% - 98%)    Parameters below as of 21 Apr 2024 16:58  Patient On (Oxygen Delivery Method): room air        LABS:                        12.5   6.71  )-----------( 287      ( 21 Apr 2024 21:08 )             37.4     04-21    142  |  103  |  13  ----------------------------<  92  3.3<L>   |  24  |  0.58    Ca    9.7      21 Apr 2024 21:08    TPro  7.9  /  Alb  4.7  /  TBili  0.4  /  DBili  x   /  AST  16  /  ALT  19  /  AlkPhos  64  04-21          PHYSICAL EXAM:  AOx3, appropriate, follows commands  PERRL, EOMI, face symmetrical   JIMENEZ x 4 with good strength   Sensation intact to light touch   No pronator drift

## 2024-04-22 NOTE — CONSULT NOTE ADULT - PROBLEM SELECTOR RECOMMENDATION 9
No acute neurosurgical intervention. Pt can follow up as outpatient w/ Dr. Lilly.    [id number]  Case to be d/w attending

## 2024-04-23 ENCOUNTER — NON-APPOINTMENT (OUTPATIENT)
Age: 49
End: 2024-04-23

## 2024-04-24 ENCOUNTER — APPOINTMENT (OUTPATIENT)
Dept: NEUROSURGERY | Facility: CLINIC | Age: 49
End: 2024-04-24
Payer: MEDICAID

## 2024-04-24 ENCOUNTER — NON-APPOINTMENT (OUTPATIENT)
Age: 49
End: 2024-04-24

## 2024-04-24 VITALS
BODY MASS INDEX: 33.79 KG/M2 | OXYGEN SATURATION: 96 % | SYSTOLIC BLOOD PRESSURE: 137 MMHG | HEART RATE: 95 BPM | WEIGHT: 179 LBS | HEIGHT: 61 IN | DIASTOLIC BLOOD PRESSURE: 86 MMHG

## 2024-04-24 DIAGNOSIS — Z86.79 PERSONAL HISTORY OF OTHER DISEASES OF THE CIRCULATORY SYSTEM: ICD-10-CM

## 2024-04-24 DIAGNOSIS — Z80.1 FAMILY HISTORY OF MALIGNANT NEOPLASM OF TRACHEA, BRONCHUS AND LUNG: ICD-10-CM

## 2024-04-24 DIAGNOSIS — Z87.39 PERSONAL HISTORY OF OTHER DISEASES OF THE MUSCULOSKELETAL SYSTEM AND CONNECTIVE TISSUE: ICD-10-CM

## 2024-04-24 DIAGNOSIS — Z83.3 FAMILY HISTORY OF DIABETES MELLITUS: ICD-10-CM

## 2024-04-24 DIAGNOSIS — Z78.9 OTHER SPECIFIED HEALTH STATUS: ICD-10-CM

## 2024-04-24 DIAGNOSIS — Z86.39 PERSONAL HISTORY OF OTHER ENDOCRINE, NUTRITIONAL AND METABOLIC DISEASE: ICD-10-CM

## 2024-04-24 DIAGNOSIS — Z86.69 PERSONAL HISTORY OF OTHER DISEASES OF THE NERVOUS SYSTEM AND SENSE ORGANS: ICD-10-CM

## 2024-04-24 PROCEDURE — 99203 OFFICE O/P NEW LOW 30 MIN: CPT

## 2024-04-24 RX ORDER — ESOMEPRAZOLE MAGNESIUM 40 MG/1
40 CAPSULE, DELAYED RELEASE ORAL
Refills: 0 | Status: ACTIVE | COMMUNITY

## 2024-04-24 NOTE — PHYSICAL EXAM
[Person] : oriented to person [Place] : oriented to place [Time] : oriented to time [Motor Tone] : muscle tone was normal in all four extremities [Abnormal Walk] : normal gait [Balance] : balance was intact

## 2024-04-25 ENCOUNTER — APPOINTMENT (OUTPATIENT)
Dept: OTOLARYNGOLOGY | Facility: CLINIC | Age: 49
End: 2024-04-25
Payer: MEDICAID

## 2024-04-25 ENCOUNTER — RESULT REVIEW (OUTPATIENT)
Age: 49
End: 2024-04-25

## 2024-04-25 VITALS
SYSTOLIC BLOOD PRESSURE: 131 MMHG | HEIGHT: 61 IN | DIASTOLIC BLOOD PRESSURE: 91 MMHG | TEMPERATURE: 98 F | WEIGHT: 179 LBS | OXYGEN SATURATION: 97 % | BODY MASS INDEX: 33.79 KG/M2 | HEART RATE: 91 BPM

## 2024-04-25 PROCEDURE — 99203 OFFICE O/P NEW LOW 30 MIN: CPT

## 2024-04-25 NOTE — ASSESSMENT
[FreeTextEntry1] : IMPRESSION:  48F with PMH of HTN, HLD, migraines p/w HA, dizziness to [facility] ED 4/21/24. CTH negative. 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 infundibulum. No history of neck trauma.   Counseled patient on the natural history of aneurysms. Explained it is unusual to be an aneurysm at this location outside of the setting of trauma. To my own interpretation, it looks to be more likely a loop of the vert within the foramen transversarium at this level. Will obtain MRA to further evaluate.     PLAN: MRA head and neck w/wo IV contrast, MRI head non con now Patient requests anti-anxiety medication prior to MR Follow up after to review results If concern for possible aneurysm again on MRA results, will consider diagnostic cerebral angiography

## 2024-04-25 NOTE — HISTORY OF PRESENT ILLNESS
[de-identified] : DAWNA MCKAY is a 48 year old female with PMH of HTN, HLD, migraines who presented to [facility] ED on 4/21/24 with multiple complaints of migraines, vertigo, toe pain, and abdominal pain. Previously saw ENT in November 2023 and underwent MRI/CT brain with no significant findings according to the patient. CTH negative. 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 Denies neck trauma.

## 2024-04-25 NOTE — REASON FOR VISIT
[New Patient Visit] : a new patient visit [Family Member] : family member [FreeTextEntry1] : MARCELLE ED admission 4/21/24

## 2024-04-26 ENCOUNTER — APPOINTMENT (OUTPATIENT)
Dept: OTOLARYNGOLOGY | Facility: CLINIC | Age: 49
End: 2024-04-26
Payer: MEDICAID

## 2024-04-26 VITALS
HEART RATE: 97 BPM | TEMPERATURE: 98 F | OXYGEN SATURATION: 97 % | DIASTOLIC BLOOD PRESSURE: 96 MMHG | SYSTOLIC BLOOD PRESSURE: 141 MMHG | BODY MASS INDEX: 33.79 KG/M2 | HEIGHT: 61 IN | WEIGHT: 179 LBS

## 2024-04-26 DIAGNOSIS — H93.13 TINNITUS, BILATERAL: ICD-10-CM

## 2024-04-26 PROCEDURE — 92557 COMPREHENSIVE HEARING TEST: CPT

## 2024-04-26 PROCEDURE — 99214 OFFICE O/P EST MOD 30 MIN: CPT

## 2024-04-26 PROCEDURE — 92550 TYMPANOMETRY & REFLEX THRESH: CPT | Mod: 52

## 2024-04-26 NOTE — HISTORY OF PRESENT ILLNESS
[de-identified] : - 4/25/24 47 yo female who present with tinnitus, bilateral, but worse on the left.  Sounds like a "vroom and hammering."  This is not pulsatile in nature.  Feels that hearing can fluctuate and change.  No otalgia or otorrhea.  Her last hearing test as 6mo ago in [place], and she was told that she had hearing loss.  Sleep is not good.  No new stress. No caffeine.  No alcohol.  Had 1 episode of dizziness that she describes as room spinning.  It lasted for until a day or so.  She was recently int he ED for a vertebral aneurysm with follow up with Neurosurgery.  They are following and recommended more imaging. - [FreeTextEntry1] : 4/26/24: Patient completed audio/tymps and presents to review those results.

## 2024-04-26 NOTE — DATA REVIEWED
[de-identified] : - 4/26/2024 AUDIOGRAM (See scanned audiogram) RIGHT: Mild SNHL LEFT: Mild SNHL Type Ad Tympanogram AD, Type A Tympanogram AS Word recognition: 100% bilaterally.

## 2024-05-07 ENCOUNTER — APPOINTMENT (OUTPATIENT)
Dept: MRI IMAGING | Facility: CLINIC | Age: 49
End: 2024-05-07

## 2024-05-07 RX ORDER — ALPRAZOLAM 0.25 MG/1
0.25 TABLET ORAL
Qty: 2 | Refills: 0 | Status: ACTIVE | COMMUNITY
Start: 2024-05-07 | End: 1900-01-01

## 2024-05-09 ENCOUNTER — APPOINTMENT (OUTPATIENT)
Dept: MRI IMAGING | Facility: CLINIC | Age: 49
End: 2024-05-09
Payer: MEDICAID

## 2024-05-09 PROCEDURE — 70544 MR ANGIOGRAPHY HEAD W/O DYE: CPT | Mod: 59

## 2024-05-09 PROCEDURE — 70547 MR ANGIOGRAPHY NECK W/O DYE: CPT

## 2024-05-09 PROCEDURE — 70551 MRI BRAIN STEM W/O DYE: CPT

## 2024-05-14 ENCOUNTER — APPOINTMENT (OUTPATIENT)
Dept: MRI IMAGING | Facility: CLINIC | Age: 49
End: 2024-05-14

## 2024-05-15 ENCOUNTER — APPOINTMENT (OUTPATIENT)
Dept: NEUROSURGERY | Facility: CLINIC | Age: 49
End: 2024-05-15
Payer: MEDICAID

## 2024-05-15 DIAGNOSIS — I72.6 ANEURYSM OF VERTEBRAL ARTERY: ICD-10-CM

## 2024-05-15 DIAGNOSIS — Z86.79 PERSONAL HISTORY OF OTHER DISEASES OF THE CIRCULATORY SYSTEM: ICD-10-CM

## 2024-05-15 PROCEDURE — 99442: CPT

## 2024-05-15 NOTE — REASON FOR VISIT
[Home] : at home, [unfilled] , at the time of the visit. [Medical Office: ([facility])___] : at the medical office located in  [Verbal consent obtained from patient] : the patient, [unfilled] [Follow-Up: _____] : a [unfilled] follow-up visit [Family Member] : family member [FreeTextEntry1] : Reviewed MRA head and neck w/wo IV contrast, MRI head non con done 5/9/24

## 2024-05-15 NOTE — DATA REVIEWED
[de-identified] : MRA head and neck w/wo IV contrast 5/9/24 [de-identified] : MRI head non con 5/9/24

## 2024-05-15 NOTE — HISTORY OF PRESENT ILLNESS
[FreeTextEntry1] : DAWNA MCKAY is a 48 year old female with PMH of HTN, HLD, migraines who presented to [facility] ED on 4/21/24 with multiple complaints of migraines, vertigo, toe pain, and abdominal pain. Previously saw ENT in November 2023 and underwent MRI/CT brain with no significant findings according to the patient. CTH negative. 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 Denies neck trauma.  Today, patient and brother present for follow up phone call to review results of MRA/MRI brain obtained on 5/9/24.

## 2024-05-15 NOTE — ASSESSMENT
[FreeTextEntry1] : IMPRESSION: 48F with PMH of HTN, HLD, migraines p/w HA, dizziness to [facility] ED 4/21/24. CTH negative. 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 infundibulum. No history of neck trauma. Assured her headaches, back pain, feelings of cold sensation are unrelated to this finding.   MRI brain 5/9/24 shows no acute infarcts. 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 It is in the mid V2 segment in the neck, not intracranial and thus it poses very low risk. Recommend baby aspirin in order to reduce the risk of thrombus formation. Will monitor annually and if there is progression can consider endovascular treatment.    PLAN: Start aspirin 81mg daily MRA head and neck non con in 1 year - May 2025 Follow up phone call with brother after for review

## 2024-05-16 ENCOUNTER — EMERGENCY (EMERGENCY)
Facility: HOSPITAL | Age: 49
LOS: 1 days | Discharge: HOME | End: 2024-05-16
Attending: STUDENT IN AN ORGANIZED HEALTH CARE EDUCATION/TRAINING PROGRAM
Payer: MEDICAID

## 2024-05-16 VITALS
DIASTOLIC BLOOD PRESSURE: 94 MMHG | TEMPERATURE: 98 F | SYSTOLIC BLOOD PRESSURE: 143 MMHG | RESPIRATION RATE: 18 BRPM | OXYGEN SATURATION: 97 % | HEIGHT: 61 IN | HEART RATE: 92 BPM | WEIGHT: 169.98 LBS

## 2024-05-16 DIAGNOSIS — Z98.890 OTHER SPECIFIED POSTPROCEDURAL STATES: Chronic | ICD-10-CM

## 2024-05-16 PROCEDURE — 99285 EMERGENCY DEPT VISIT HI MDM: CPT

## 2024-05-16 RX ORDER — SODIUM CHLORIDE 9 MG/ML
1000 INJECTION, SOLUTION INTRAVENOUS ONCE
Refills: 0 | Status: COMPLETED | OUTPATIENT
Start: 2024-05-16 | End: 2024-05-16

## 2024-05-16 RX ORDER — ONDANSETRON 8 MG/1
4 TABLET, FILM COATED ORAL ONCE
Refills: 0 | Status: COMPLETED | OUTPATIENT
Start: 2024-05-16 | End: 2024-05-16

## 2024-05-16 RX ORDER — KETOROLAC TROMETHAMINE 30 MG/ML
15 SYRINGE (ML) INJECTION ONCE
Refills: 0 | Status: DISCONTINUED | OUTPATIENT
Start: 2024-05-16 | End: 2024-05-16

## 2024-05-16 NOTE — ED PROVIDER NOTE - CLINICAL SUMMARY MEDICAL DECISION MAKING FREE TEXT BOX
48-year-old female past medical history of hypertension, hyperlipidemia, prediabetes, migraine, recently diagnosed L cerebral artery aneurysm (4mm), here for chest pain, LUQ pain, nausea, vomiting, chills, palpitations diarrhea and headache, CP and SOB x2 days.  had MRI this past week showing stable aneurysm given she has had persistent symptoms of cold feeling on the right side of her head, was told it was unrelated given stable aneurysm on MRI. l vitals within normal limits. physical exam no neuro deficits, no m/r/g, lungs clear b/l abdomen soft tender RUQ epigastric region, will get RUQ US eval for cholecystitis/choledoco, basic labs, check flu/covid given constellation of symptoms, given no neuro deficits pupils equal and reactive and recent stable MRI do not think headache or any of symptoms are neurologic or aneurysm in origin.

## 2024-05-16 NOTE — ED PROVIDER NOTE - ATTENDING CONTRIBUTION TO CARE
Attending (Geoff Damon D.O.):  I have personally seen and examined this patient. I have performed a substantive portion of the visit including all aspects of the medical decision making. Resident, fellow, student, and/or ACP note reviewed. I agree on the plan of care except where noted.    48F here for pinpoint left sided chest pain, no mod factors, pain radiation with intermittent shortness of breath. No pleuritic/exertional components. Sxs started after eating and lying down. Usual state of health yesterday morning but felt chills later in day. Denies recent travel, known sick contacts. Denies urinary sxs, diarrhea, constipation, vomiting. Trialed pepcid w/o sig improvement PTA. No assoc diaphoresis    Hemodynamically stable. NAD. AAOx4 (person, place, time, event). PERRL 3mm, EOMI w/o nystagmus, well hydrated, RRR, no audible cardiac murmurs. + ttp left anterior chest wall in region of pain. clear lungs, no inc work of breathing. + rlq abd ttp, - armas, no peritoneal signs, no cva ttp. no visible rashes nor deformities, no signs of jaundice, fluid overload, nor anemia. symm calves, no edema. full str/rom/neurovasc all 4 extrem. Steady gait.     Hx and exam not consistent with acs, pe, pulm infx. Eval for hepatobiliary etiology vs bowel. Plan for labs, UA, RUQ US, ct a/p with iv contrast. supportive care.

## 2024-05-16 NOTE — ED ADULT TRIAGE NOTE - NSWEIGHTCALCTOOLDRUG_GEN_A_CORE
Mrs. Nicolas comes in today for follow-up of the left wrist.  She had some swelling last week but this has now subsided.  She tells me the cast is comfortable.  Denies any problems.    The cast appears to fit well.  There is no skin breakdown around the margins.  Neurovascular exam is stable.    AP, oblique, and lateral views of the left wrist are ordered and reviewed.  These are compared to previous x-rays.  The oblique view gives the appearance that there is increased dorsal tilt but the lateral view seems to show good alignment.  AP view shows that her length and inclination are both well maintained.    Assessment: 1 week status post closed treatment of left distal radius fracture    Plan: We will continue use of the cast and appropriate activity modifications and restrictions.  I will see her back in 1 week for repeat x-rays.    Darnell Sahu MD      used

## 2024-05-16 NOTE — ED ADULT TRIAGE NOTE - CHIEF COMPLAINT QUOTE
L sided chest pain starting today. Also endorses upper abdominal pain, N/V, chills, palpitations, diarrhea, headache, and dizziness yesterday.

## 2024-05-16 NOTE — ED PROVIDER NOTE - STUDIES
EKG - see results section for interpretation/Xray Image(s) - see wet read section for interpretation/Ultrasound images/CT Scan images

## 2024-05-16 NOTE — ED PROVIDER NOTE - PATIENT PORTAL LINK FT
You can access the FollowMyHealth Patient Portal offered by [facility] by registering at the following website: [email or web address]. By joining [employer]’s FollowMyHealth portal, you will also be able to view your health information using other applications (apps) compatible with our system. You can access the FollowMyHealth Patient Portal offered by [facility] by registering at the following website: [email or web address]. By joining [employer]’s FollowMyHealth portal, you will also be able to view your health information using other applications (apps) compatible with our system.

## 2024-05-16 NOTE — ED PROVIDER NOTE - PROGRESS NOTE DETAILS
Attending Geoff Damon:  imaging nonactionable. labs nonactionable. UA with wbcs and leuk esterase but no urinary sxs, no fever, no wbc elevation. Opt to await ucx before treatment. trop <6 about 1 month ago and again today with unchanged EKG. SG MRI in [facility] Dr. Bravo stable aneurysm, workup benign CT no acute intraabodminal pathology, will refer to GI. Patient says dizziness is room spinning, better after meclizine, will send meclizine and antibiotics for +UA to pharmacy.

## 2024-05-16 NOTE — ED PROVIDER NOTE - NSFOLLOWUPINSTRUCTIONS_ED_ALL_ED_FT
No signs of emergency medical condition on today's workup.  You were diagnosed with Covid. Your lab work and ultrasound results are included in your discharge paperwork.    Follow up with your Primary Doctor within one week.    Take Tylenol as instructed for pain/discomfort.     Return with any chest pain, difficulty breathing, high fever not resolved by Tylenol or any other concerning symptoms. No signs of emergency medical condition on today's workup.  You were diagnosed with UTI and Vertigo, take antibiotic and Meclizine as instructed.    Follow up with your Primary Doctor within one week. Follow up with your Neurosurgeon. I have put in a referral for a Gastroenterologist for you.     Take Tylenol as instructed for pain/discomfort.     Return with any headache, change in vision, focal weakness of your arms or legs, or any other concerning symptoms.

## 2024-05-16 NOTE — ED PROVIDER NOTE - OBJECTIVE STATEMENT
48-year-old female past medical history of hypertension, hyperlipidemia, prediabetes, migraine, recently diagnosed L cerebral artery aneurysm (4mm), here for chest pain, LUQ pain, nausea, vomiting, chills, palpitations diarrhea and headache. also notes central CP with SOB. all started with lightheadedness yesterday, and then proceeded to have abdominal pain, nausea, vomiting, and non bloody diarrhea. denies fevers, chills, vision change, dysuria, blood in stool, had previous MRI this week which showed stable aneurysm.   on arrival /94 RR 18 HR 92 afebrile 97% on RA.

## 2024-05-16 NOTE — ED PROVIDER NOTE - PHYSICAL EXAMINATION
GENERAL: well appearing in no acute distress, non-toxic appearing  CARDIAC: regular rate and rhythm, normal S1S2, no appreciable murmurs, 2+ pulses in UE/LE b/l  PULM: normal breath sounds, clear to ascultation bilaterally, no rales, rhonchi, wheezing  GI: abdomen nondistended, soft, tender epigastric RUQ, no guarding, rebound tenderness  NEURO: no focal motor or sensory deficits, CN2-12 intact, normal speech, PERRLA, EOMI,   MSK: no peripheral edema, no calf tenderness b/l

## 2024-05-17 VITALS
DIASTOLIC BLOOD PRESSURE: 80 MMHG | SYSTOLIC BLOOD PRESSURE: 140 MMHG | HEART RATE: 72 BPM | RESPIRATION RATE: 18 BRPM | OXYGEN SATURATION: 98 %

## 2024-05-17 LAB
ALBUMIN SERPL ELPH-MCNC: 4.8 G/DL — SIGNIFICANT CHANGE UP (ref 3.3–5)
ALP SERPL-CCNC: 76 U/L — SIGNIFICANT CHANGE UP (ref 40–120)
ALT FLD-CCNC: 18 U/L — SIGNIFICANT CHANGE UP (ref 10–45)
ANION GAP SERPL CALC-SCNC: 13 MMOL/L — SIGNIFICANT CHANGE UP (ref 5–17)
APPEARANCE UR: CLEAR — SIGNIFICANT CHANGE UP
APTT BLD: 35.6 SEC — SIGNIFICANT CHANGE UP (ref 24.5–35.6)
AST SERPL-CCNC: 16 U/L — SIGNIFICANT CHANGE UP (ref 10–40)
BACTERIA # UR AUTO: NEGATIVE /HPF — SIGNIFICANT CHANGE UP
BASOPHILS # BLD AUTO: 0.01 K/UL — SIGNIFICANT CHANGE UP (ref 0–0.2)
BASOPHILS NFR BLD AUTO: 0.2 % — SIGNIFICANT CHANGE UP (ref 0–2)
BILIRUB SERPL-MCNC: 0.2 MG/DL — SIGNIFICANT CHANGE UP (ref 0.2–1.2)
BILIRUB UR-MCNC: NEGATIVE — SIGNIFICANT CHANGE UP
BLD GP AB SCN SERPL QL: NEGATIVE — SIGNIFICANT CHANGE UP
BUN SERPL-MCNC: 8 MG/DL — SIGNIFICANT CHANGE UP (ref 7–23)
CALCIUM SERPL-MCNC: 10.1 MG/DL — SIGNIFICANT CHANGE UP (ref 8.4–10.5)
CAST: 0 /LPF — SIGNIFICANT CHANGE UP (ref 0–4)
CHLORIDE SERPL-SCNC: 105 MMOL/L — SIGNIFICANT CHANGE UP (ref 96–108)
CO2 SERPL-SCNC: 26 MMOL/L — SIGNIFICANT CHANGE UP (ref 22–31)
COLOR SPEC: YELLOW — SIGNIFICANT CHANGE UP
CREAT SERPL-MCNC: 0.46 MG/DL — LOW (ref 0.5–1.3)
DIFF PNL FLD: NEGATIVE — SIGNIFICANT CHANGE UP
EGFR: 117 ML/MIN/1.73M2 — SIGNIFICANT CHANGE UP
EOSINOPHIL # BLD AUTO: 0.07 K/UL — SIGNIFICANT CHANGE UP (ref 0–0.5)
EOSINOPHIL NFR BLD AUTO: 1.2 % — SIGNIFICANT CHANGE UP (ref 0–6)
FLUAV AG NPH QL: SIGNIFICANT CHANGE UP
FLUBV AG NPH QL: SIGNIFICANT CHANGE UP
GLUCOSE SERPL-MCNC: 92 MG/DL — SIGNIFICANT CHANGE UP (ref 70–99)
GLUCOSE UR QL: NEGATIVE MG/DL — SIGNIFICANT CHANGE UP
HCT VFR BLD CALC: 39.7 % — SIGNIFICANT CHANGE UP (ref 34.5–45)
HGB BLD-MCNC: 12.8 G/DL — SIGNIFICANT CHANGE UP (ref 11.5–15.5)
IMM GRANULOCYTES NFR BLD AUTO: 0.3 % — SIGNIFICANT CHANGE UP (ref 0–0.9)
INR BLD: 1 RATIO — SIGNIFICANT CHANGE UP (ref 0.85–1.18)
KETONES UR-MCNC: NEGATIVE MG/DL — SIGNIFICANT CHANGE UP
LEUKOCYTE ESTERASE UR-ACNC: ABNORMAL
LIDOCAIN IGE QN: 30 U/L — SIGNIFICANT CHANGE UP (ref 7–60)
LYMPHOCYTES # BLD AUTO: 2.37 K/UL — SIGNIFICANT CHANGE UP (ref 1–3.3)
LYMPHOCYTES # BLD AUTO: 40.7 % — SIGNIFICANT CHANGE UP (ref 13–44)
MAGNESIUM SERPL-MCNC: 2 MG/DL — SIGNIFICANT CHANGE UP (ref 1.6–2.6)
MCHC RBC-ENTMCNC: 27.6 PG — SIGNIFICANT CHANGE UP (ref 27–34)
MCHC RBC-ENTMCNC: 32.2 GM/DL — SIGNIFICANT CHANGE UP (ref 32–36)
MCV RBC AUTO: 85.7 FL — SIGNIFICANT CHANGE UP (ref 80–100)
MONOCYTES # BLD AUTO: 0.44 K/UL — SIGNIFICANT CHANGE UP (ref 0–0.9)
MONOCYTES NFR BLD AUTO: 7.5 % — SIGNIFICANT CHANGE UP (ref 2–14)
NEUTROPHILS # BLD AUTO: 2.92 K/UL — SIGNIFICANT CHANGE UP (ref 1.8–7.4)
NEUTROPHILS NFR BLD AUTO: 50.1 % — SIGNIFICANT CHANGE UP (ref 43–77)
NITRITE UR-MCNC: NEGATIVE — SIGNIFICANT CHANGE UP
NRBC # BLD: 0 /100 WBCS — SIGNIFICANT CHANGE UP (ref 0–0)
NT-PROBNP SERPL-SCNC: <36 PG/ML — SIGNIFICANT CHANGE UP (ref 0–300)
PH UR: 6.5 — SIGNIFICANT CHANGE UP (ref 5–8)
PLATELET # BLD AUTO: 277 K/UL — SIGNIFICANT CHANGE UP (ref 150–400)
POTASSIUM SERPL-MCNC: 3.5 MMOL/L — SIGNIFICANT CHANGE UP (ref 3.5–5.3)
POTASSIUM SERPL-SCNC: 3.5 MMOL/L — SIGNIFICANT CHANGE UP (ref 3.5–5.3)
PROT SERPL-MCNC: 8.4 G/DL — HIGH (ref 6–8.3)
PROT UR-MCNC: NEGATIVE MG/DL — SIGNIFICANT CHANGE UP
PROTHROM AB SERPL-ACNC: 10.5 SEC — SIGNIFICANT CHANGE UP (ref 9.5–13)
RBC # BLD: 4.63 M/UL — SIGNIFICANT CHANGE UP (ref 3.8–5.2)
RBC # FLD: 13.2 % — SIGNIFICANT CHANGE UP (ref 10.3–14.5)
RBC CASTS # UR COMP ASSIST: 0 /HPF — SIGNIFICANT CHANGE UP (ref 0–4)
RH IG SCN BLD-IMP: POSITIVE — SIGNIFICANT CHANGE UP
RSV RNA NPH QL NAA+NON-PROBE: SIGNIFICANT CHANGE UP
SARS-COV-2 RNA SPEC QL NAA+PROBE: SIGNIFICANT CHANGE UP
SODIUM SERPL-SCNC: 144 MMOL/L — SIGNIFICANT CHANGE UP (ref 135–145)
SP GR SPEC: 1.01 — SIGNIFICANT CHANGE UP (ref 1–1.03)
SQUAMOUS # UR AUTO: 1 /HPF — SIGNIFICANT CHANGE UP (ref 0–5)
TROPONIN T, HIGH SENSITIVITY RESULT: <6 NG/L — SIGNIFICANT CHANGE UP (ref 0–51)
UROBILINOGEN FLD QL: 0.2 MG/DL — SIGNIFICANT CHANGE UP (ref 0.2–1)
WBC # BLD: 5.83 K/UL — SIGNIFICANT CHANGE UP (ref 3.8–10.5)
WBC # FLD AUTO: 5.83 K/UL — SIGNIFICANT CHANGE UP (ref 3.8–10.5)
WBC UR QL: 10 /HPF — HIGH (ref 0–5)

## 2024-05-17 PROCEDURE — 74177 CT ABD & PELVIS W/CONTRAST: CPT | Mod: MC

## 2024-05-17 PROCEDURE — 71045 X-RAY EXAM CHEST 1 VIEW: CPT

## 2024-05-17 PROCEDURE — 86900 BLOOD TYPING SEROLOGIC ABO: CPT

## 2024-05-17 PROCEDURE — 85025 COMPLETE CBC W/AUTO DIFF WBC: CPT

## 2024-05-17 PROCEDURE — 71045 X-RAY EXAM CHEST 1 VIEW: CPT | Mod: 26

## 2024-05-17 PROCEDURE — 74177 CT ABD & PELVIS W/CONTRAST: CPT | Mod: 26,MC

## 2024-05-17 PROCEDURE — 86850 RBC ANTIBODY SCREEN: CPT

## 2024-05-17 PROCEDURE — 76705 ECHO EXAM OF ABDOMEN: CPT | Mod: 26

## 2024-05-17 PROCEDURE — 93005 ELECTROCARDIOGRAM TRACING: CPT

## 2024-05-17 PROCEDURE — 99285 EMERGENCY DEPT VISIT HI MDM: CPT | Mod: 25

## 2024-05-17 PROCEDURE — 86901 BLOOD TYPING SEROLOGIC RH(D): CPT

## 2024-05-17 PROCEDURE — 83690 ASSAY OF LIPASE: CPT

## 2024-05-17 PROCEDURE — 83880 ASSAY OF NATRIURETIC PEPTIDE: CPT

## 2024-05-17 PROCEDURE — 81001 URINALYSIS AUTO W/SCOPE: CPT

## 2024-05-17 PROCEDURE — 85610 PROTHROMBIN TIME: CPT

## 2024-05-17 PROCEDURE — 96374 THER/PROPH/DIAG INJ IV PUSH: CPT | Mod: XU

## 2024-05-17 PROCEDURE — 85730 THROMBOPLASTIN TIME PARTIAL: CPT

## 2024-05-17 PROCEDURE — 87637 SARSCOV2&INF A&B&RSV AMP PRB: CPT

## 2024-05-17 PROCEDURE — 80053 COMPREHEN METABOLIC PANEL: CPT

## 2024-05-17 PROCEDURE — 84484 ASSAY OF TROPONIN QUANT: CPT

## 2024-05-17 PROCEDURE — 76705 ECHO EXAM OF ABDOMEN: CPT

## 2024-05-17 PROCEDURE — 96375 TX/PRO/DX INJ NEW DRUG ADDON: CPT | Mod: XU

## 2024-05-17 PROCEDURE — 36415 COLL VENOUS BLD VENIPUNCTURE: CPT

## 2024-05-17 PROCEDURE — 83735 ASSAY OF MAGNESIUM: CPT

## 2024-05-17 RX ORDER — CEFTRIAXONE 500 MG/1
1000 INJECTION, POWDER, FOR SOLUTION INTRAMUSCULAR; INTRAVENOUS ONCE
Refills: 0 | Status: COMPLETED | OUTPATIENT
Start: 2024-05-17 | End: 2024-05-17

## 2024-05-17 RX ORDER — MECLIZINE HCL 12.5 MG
50 TABLET ORAL ONCE
Refills: 0 | Status: COMPLETED | OUTPATIENT
Start: 2024-05-17 | End: 2024-05-17

## 2024-05-17 RX ORDER — MECLIZINE HCL 12.5 MG
1 TABLET ORAL
Qty: 14 | Refills: 0
Start: 2024-05-17 | End: 2024-05-23

## 2024-05-17 RX ORDER — CEFPODOXIME PROXETIL 100 MG
1 TABLET ORAL
Qty: 10 | Refills: 0
Start: 2024-05-17 | End: 2024-05-21

## 2024-05-17 RX ADMIN — Medication 30 MILLILITER(S): at 00:41

## 2024-05-17 RX ADMIN — ONDANSETRON 4 MILLIGRAM(S): 8 TABLET, FILM COATED ORAL at 00:40

## 2024-05-17 RX ADMIN — SODIUM CHLORIDE 1000 MILLILITER(S): 9 INJECTION, SOLUTION INTRAVENOUS at 00:41

## 2024-05-17 RX ADMIN — Medication 15 MILLIGRAM(S): at 00:40

## 2024-05-17 RX ADMIN — Medication 50 MILLIGRAM(S): at 02:12

## 2024-05-17 RX ADMIN — CEFTRIAXONE 100 MILLIGRAM(S): 500 INJECTION, POWDER, FOR SOLUTION INTRAMUSCULAR; INTRAVENOUS at 03:26

## 2024-05-17 NOTE — ED ADULT NURSE NOTE - OBJECTIVE STATEMENT
48 y.o F w. PMH of HTN, HLD, migraine presents to ED complaining of chest pain. Pt states that she started experiencing epigastric pain with vomiting, chills, palpitations, diarrhea and headache. Pt also endorses CP and SOB with dizziniess that began yesterday. Pt states that she is having non-bloody diarrhea and vomiting. Pt denies fever, chills, urinary symptoms.

## 2024-07-08 NOTE — ED ADULT TRIAGE NOTE - INTERNATIONAL TRAVEL COUNTRIES
Pt left ED ambulatory with father at this time. Pt and father verbalized understanding of discharge instructions and quarantine    [place]

## 2025-05-14 ENCOUNTER — APPOINTMENT (OUTPATIENT)
Dept: NEUROSURGERY | Facility: CLINIC | Age: 50
End: 2025-05-14

## (undated) DEVICE — FRAZIER SUCTION TIP 8FR

## (undated) DEVICE — DRSG STERISTRIPS 0.5 X 4"

## (undated) DEVICE — BLADE SURGICAL #15 CARBON

## (undated) DEVICE — DRAPE 1/2 SHEET 40X57"

## (undated) DEVICE — GLV 7.5 PROTEXIS (WHITE)

## (undated) DEVICE — FOLEY TRAY 16FR 5CC LTX UMETER CLOSED

## (undated) DEVICE — VENODYNE/SCD SLEEVE CALF BARIATRIC

## (undated) DEVICE — SUT SOFSILK 2-0 18" TIES

## (undated) DEVICE — DRAPE SPLIT SHEET 77" X 108"

## (undated) DEVICE — SUCTION YANKAUER NO CONTROL VENT

## (undated) DEVICE — DRAPE MAGNETIC INSTRUMENT MEDIUM

## (undated) DEVICE — SUT SOFSILK 0 18" TIES

## (undated) DEVICE — VENODYNE/SCD SLEEVE CALF LARGE

## (undated) DEVICE — DRSG KLING 6"

## (undated) DEVICE — SOL IRR POUR NS 0.9% 500ML

## (undated) DEVICE — LAP PAD 18 X 18"

## (undated) DEVICE — VENODYNE/SCD SLEEVE CALF MEDIUM

## (undated) DEVICE — FOR-TOURNIQUET 27679911: Type: DURABLE MEDICAL EQUIPMENT

## (undated) DEVICE — WARMING BLANKET UPPER ADULT

## (undated) DEVICE — ELCTR GROUNDING PAD ADULT COVIDIEN

## (undated) DEVICE — POSITIONER STRAP ARMBOARD VELCRO TS-30

## (undated) DEVICE — PACK GENERAL MINOR

## (undated) DEVICE — DRSG KLING 4"

## (undated) DEVICE — DRSG CURITY GAUZE SPONGE 4 X 4" 12-PLY

## (undated) DEVICE — DRAPE LIGHT HANDLE COVER (BLUE)

## (undated) DEVICE — GLV 6.5 PROTEXIS (WHITE)

## (undated) DEVICE — BLADE SCALPEL SAFETYLOCK #11

## (undated) DEVICE — SUT SOFSILK 3-0 18" TIES

## (undated) DEVICE — MARKING PEN W RULER

## (undated) DEVICE — SUT MONOCRYL 4-0 27" PS-2 UNDYED

## (undated) DEVICE — SPECIMEN CONTAINER 100ML

## (undated) DEVICE — SYR LUER LOK 10CC

## (undated) DEVICE — DRSG STERISTRIPS 0.25 X 3"

## (undated) DEVICE — GLV 8 PROTEXIS (WHITE)

## (undated) DEVICE — NDL HYPO SAFE 18G X 1.5" (PINK)

## (undated) DEVICE — VISITEC 4X4

## (undated) DEVICE — DRSG XEROFORM 5 X 9"

## (undated) DEVICE — DRAPE TOWEL BLUE 17" X 24"

## (undated) DEVICE — GLV 7 PROTEXIS (WHITE)

## (undated) DEVICE — PACK LIJ BASIC ORTHO

## (undated) DEVICE — BLADE SCALPEL SAFETYLOCK #10

## (undated) DEVICE — DRSG STOCKINETTE TUBULAR 6"

## (undated) DEVICE — DRSG ACE BANDAGE 4" NS

## (undated) DEVICE — VESSEL LOOP ASPEN MAXI BLUE

## (undated) DEVICE — BLADE SCALPEL SAFETYLOCK #15

## (undated) DEVICE — POSITIONER FOAM EGG CRATE ULNAR 2PCS (PINK)

## (undated) DEVICE — Device

## (undated) DEVICE — PREP CHLORAPREP HI-LITE ORANGE 26ML

## (undated) DEVICE — DRAPE 3/4 SHEET W REINFORCEMENT 56X77"

## (undated) DEVICE — SUT VICRYL 2-0 27" FS-1 UNDYED

## (undated) DEVICE — FOR-ESU VALLEYLAB T7E14999DX: Type: DURABLE MEDICAL EQUIPMENT

## (undated) DEVICE — MEDICATION LABELS W MARKER

## (undated) DEVICE — DRAPE INSTRUMENT POUCH 6.75" X 11"

## (undated) DEVICE — CANISTER DISPOSABLE THIN WALL 3000CC

## (undated) DEVICE — GOWN XL

## (undated) DEVICE — TOURNIQUET ESMARK 4"

## (undated) DEVICE — DRSG TEGADERM 6"X8"

## (undated) DEVICE — TOURNIQUET SET TOURNIKWIK 12FR (2 TUBES, 1 SNARE) 6"

## (undated) DEVICE — STAPLER SKIN VISI-STAT 35 WIDE

## (undated) DEVICE — LABELS BLANK W PEN

## (undated) DEVICE — DRSG OPSITE 13.75 X 4"

## (undated) DEVICE — NDL HYPO REGULAR BEVEL 25G X 1.5" (BLUE)

## (undated) DEVICE — DRSG MASTISOL

## (undated) DEVICE — TOURNIQUET CUFF 18" DUAL PORT SINGLE BLADDER LUER LOCK (BLACK)

## (undated) DEVICE — GLV 8.5 PROTEXIS (WHITE)

## (undated) DEVICE — GOWN TRIMAX LG

## (undated) DEVICE — SUT SOFSILK 4-0 18" TIES

## (undated) DEVICE — SOL IRR POUR H2O 250ML